# Patient Record
Sex: FEMALE | Race: WHITE | NOT HISPANIC OR LATINO | Employment: OTHER | RURAL
[De-identification: names, ages, dates, MRNs, and addresses within clinical notes are randomized per-mention and may not be internally consistent; named-entity substitution may affect disease eponyms.]

---

## 2017-01-06 ENCOUNTER — HISTORICAL (OUTPATIENT)
Dept: ADMINISTRATIVE | Facility: HOSPITAL | Age: 82
End: 2017-01-06

## 2017-01-09 LAB
LAB AP CLINICAL INFORMATION: NORMAL
LAB AP DIAGNOSIS - HISTORICAL: NORMAL
LAB AP GROSS PATHOLOGY - HISTORICAL: NORMAL
LAB AP SPECIMEN SUBMITTED - HISTORICAL: NORMAL

## 2020-06-04 ENCOUNTER — HISTORICAL (OUTPATIENT)
Dept: ADMINISTRATIVE | Facility: HOSPITAL | Age: 85
End: 2020-06-04

## 2021-04-19 ENCOUNTER — TELEPHONE (OUTPATIENT)
Dept: UROLOGY | Facility: CLINIC | Age: 86
End: 2021-04-19

## 2021-04-22 ENCOUNTER — HISTORICAL (OUTPATIENT)
Dept: ADMINISTRATIVE | Facility: HOSPITAL | Age: 86
End: 2021-04-22

## 2021-04-22 LAB
ANION GAP SERPL CALCULATED.3IONS-SCNC: 14 MMOL/L
BACTERIA #/AREA URNS HPF: ABNORMAL /HPF
BASOPHILS # BLD AUTO: 0.06 X10E3/UL (ref 0–0.2)
BASOPHILS NFR BLD AUTO: 0.7 % (ref 0–1)
BILIRUB UR QL STRIP: NEGATIVE MG/DL
BUN SERPL-MCNC: 26 MG/DL (ref 7–18)
CALCIUM SERPL-MCNC: 9.4 MG/DL (ref 8.5–10.1)
CHLORIDE SERPL-SCNC: 101 MMOL/L (ref 101–111)
CLARITY UR: ABNORMAL
CO2 SERPL-SCNC: 27 MMOL/L (ref 21–32)
COLOR UR: YELLOW
CREAT SERPL-MCNC: 1.2 MG/DL (ref 0.6–1.3)
EOSINOPHIL # BLD AUTO: 0.13 X10E3/UL (ref 0–0.5)
EOSINOPHIL NFR BLD AUTO: 1.5 % (ref 1–4)
ERYTHROCYTE [DISTWIDTH] IN BLOOD BY AUTOMATED COUNT: 15.1 % (ref 11.5–14.5)
GLUCOSE SERPL-MCNC: 137 MG/DL (ref 74–106)
GLUCOSE UR STRIP-MCNC: NEGATIVE MG/DL
HCT VFR BLD AUTO: 35.1 % (ref 38–47)
HGB BLD-MCNC: 12.2 G/DL (ref 12–16)
KETONES UR STRIP-SCNC: NEGATIVE MG/DL
LEUKOCYTE ESTERASE UR QL STRIP: ABNORMAL LEU/UL
LYMPHOCYTES # BLD AUTO: 3.18 X10E3/UL (ref 1–4.8)
LYMPHOCYTES NFR BLD AUTO: 37.5 % (ref 27–41)
MCH RBC QN AUTO: 33.2 PG (ref 27–31)
MCHC RBC AUTO-ENTMCNC: 34.8 G/DL (ref 32–36)
MCV RBC AUTO: 95 FL (ref 80–96)
MONOCYTES # BLD AUTO: 0.67 X10E3/UL (ref 0–0.8)
MONOCYTES NFR BLD AUTO: 7.9 % (ref 2–6)
MPC BLD CALC-MCNC: 9.7 FL (ref 9.4–12.4)
NEUTROPHILS # BLD AUTO: 4.43 X10E3/UL (ref 1.8–7.7)
NEUTROPHILS NFR BLD AUTO: 52.4 % (ref 53–65)
NITRITE UR QL STRIP: NEGATIVE
PH UR STRIP: 6 PH UNITS (ref 5–8)
PLATELET # BLD AUTO: 254 X10E3/UL (ref 150–400)
POTASSIUM SERPL-SCNC: 4.6 MMOL/L (ref 3.6–5)
PROT UR QL STRIP: 30 MG/DL
RBC # BLD AUTO: 3.68 X10E6/UL (ref 4.2–5.4)
RBC # UR STRIP: NEGATIVE ERY/UL
RBC #/AREA URNS HPF: ABNORMAL /HPF (ref 0–3)
SODIUM SERPL-SCNC: 137 MMOL/L (ref 135–145)
SP GR UR STRIP: 1.01 (ref 1–1.03)
SQUAMOUS #/AREA URNS LPF: ABNORMAL /LPF
UROBILINOGEN UR STRIP-ACNC: 0.2 MG/DL
WBC # BLD AUTO: 8.47 X10E3/UL (ref 4.5–11)
WBC #/AREA URNS HPF: ABNORMAL /HPF (ref 0–5)

## 2021-06-12 ENCOUNTER — HOSPITAL ENCOUNTER (INPATIENT)
Facility: HOSPITAL | Age: 86
LOS: 3 days | Discharge: NURSING, REHAB OR LONG-TERM CARE | DRG: 948 | End: 2021-06-15
Attending: INTERNAL MEDICINE | Admitting: INTERNAL MEDICINE
Payer: MEDICARE

## 2021-06-12 DIAGNOSIS — R52 INTRACTABLE PAIN: ICD-10-CM

## 2021-06-12 DIAGNOSIS — R53.1 ASTHENIA: ICD-10-CM

## 2021-06-12 DIAGNOSIS — N30.01 ACUTE CYSTITIS WITH HEMATURIA: ICD-10-CM

## 2021-06-12 DIAGNOSIS — R07.9 CHEST PAIN: ICD-10-CM

## 2021-06-12 DIAGNOSIS — S32.592A FRACTURE OF MULTIPLE PUBIC RAMI, LEFT, CLOSED, INITIAL ENCOUNTER: ICD-10-CM

## 2021-06-12 DIAGNOSIS — E87.5 HYPERKALEMIA: Primary | ICD-10-CM

## 2021-06-12 LAB
ALBUMIN SERPL BCP-MCNC: 3.5 G/DL (ref 3.5–5)
ALBUMIN/GLOB SERPL: 0.8 {RATIO}
ALP SERPL-CCNC: 75 U/L (ref 55–142)
ALT SERPL W P-5'-P-CCNC: 35 U/L (ref 13–56)
ANION GAP SERPL CALCULATED.3IONS-SCNC: 17 MMOL/L (ref 7–16)
AST SERPL W P-5'-P-CCNC: 44 U/L (ref 15–37)
BILIRUB SERPL-MCNC: 0.6 MG/DL (ref 0–1.2)
BUN SERPL-MCNC: 23 MG/DL (ref 7–18)
BUN/CREAT SERPL: 22 (ref 6–20)
CALCIUM SERPL-MCNC: 9.3 MG/DL (ref 8.5–10.1)
CHLORIDE SERPL-SCNC: 103 MMOL/L (ref 98–107)
CO2 SERPL-SCNC: 27 MMOL/L (ref 21–32)
CREAT SERPL-MCNC: 1.06 MG/DL (ref 0.55–1.02)
GLOBULIN SER-MCNC: 4.2 G/DL (ref 2–4)
GLUCOSE SERPL-MCNC: 156 MG/DL (ref 74–106)
LACTATE SERPL-SCNC: 1.8 MMOL/L (ref 0.4–2)
LACTATE SERPL-SCNC: 2.2 MMOL/L (ref 0.4–2)
POTASSIUM SERPL-SCNC: 5.1 MMOL/L (ref 3.5–5.1)
PROT SERPL-MCNC: 7.7 G/DL (ref 6.4–8.2)
SODIUM SERPL-SCNC: 142 MMOL/L (ref 136–145)

## 2021-06-12 PROCEDURE — 93010 EKG 12-LEAD: ICD-10-PCS | Mod: ,,, | Performed by: HOSPITALIST

## 2021-06-12 PROCEDURE — 83605 ASSAY OF LACTIC ACID: CPT | Performed by: INTERNAL MEDICINE

## 2021-06-12 PROCEDURE — 87086 URINE CULTURE/COLONY COUNT: CPT | Performed by: INTERNAL MEDICINE

## 2021-06-12 PROCEDURE — 25000003 PHARM REV CODE 250: Performed by: INTERNAL MEDICINE

## 2021-06-12 PROCEDURE — 93010 ELECTROCARDIOGRAM REPORT: CPT | Mod: ,,, | Performed by: HOSPITALIST

## 2021-06-12 PROCEDURE — 63600175 PHARM REV CODE 636 W HCPCS: Performed by: INTERNAL MEDICINE

## 2021-06-12 PROCEDURE — 99223 1ST HOSP IP/OBS HIGH 75: CPT | Mod: AI,,, | Performed by: INTERNAL MEDICINE

## 2021-06-12 PROCEDURE — 97162 PT EVAL MOD COMPLEX 30 MIN: CPT

## 2021-06-12 PROCEDURE — 99223 PR INITIAL HOSPITAL CARE,LEVL III: ICD-10-PCS | Mod: AI,,, | Performed by: INTERNAL MEDICINE

## 2021-06-12 PROCEDURE — 97166 OT EVAL MOD COMPLEX 45 MIN: CPT

## 2021-06-12 PROCEDURE — 36415 COLL VENOUS BLD VENIPUNCTURE: CPT | Performed by: INTERNAL MEDICINE

## 2021-06-12 PROCEDURE — 93005 ELECTROCARDIOGRAM TRACING: CPT

## 2021-06-12 PROCEDURE — 11000001 HC ACUTE MED/SURG PRIVATE ROOM

## 2021-06-12 PROCEDURE — 80053 COMPREHEN METABOLIC PANEL: CPT | Performed by: INTERNAL MEDICINE

## 2021-06-12 RX ORDER — HYDROCODONE BITARTRATE AND ACETAMINOPHEN 5; 325 MG/1; MG/1
1 TABLET ORAL EVERY 6 HOURS PRN
Status: DISCONTINUED | OUTPATIENT
Start: 2021-06-12 | End: 2021-06-15 | Stop reason: HOSPADM

## 2021-06-12 RX ORDER — SODIUM CHLORIDE 0.9 % (FLUSH) 0.9 %
10 SYRINGE (ML) INJECTION
Status: DISCONTINUED | OUTPATIENT
Start: 2021-06-12 | End: 2021-06-15 | Stop reason: HOSPADM

## 2021-06-12 RX ORDER — ACETAMINOPHEN 325 MG/1
650 TABLET ORAL EVERY 4 HOURS PRN
Status: DISCONTINUED | OUTPATIENT
Start: 2021-06-12 | End: 2021-06-15 | Stop reason: HOSPADM

## 2021-06-12 RX ORDER — LEVOTHYROXINE SODIUM 150 UG/1
150 TABLET ORAL
COMMUNITY
End: 2023-10-19

## 2021-06-12 RX ORDER — DIGOXIN 125 MCG
125 TABLET ORAL DAILY
Status: DISCONTINUED | OUTPATIENT
Start: 2021-06-12 | End: 2021-06-15 | Stop reason: HOSPADM

## 2021-06-12 RX ORDER — GABAPENTIN 100 MG/1
100 CAPSULE ORAL 2 TIMES DAILY
Status: DISCONTINUED | OUTPATIENT
Start: 2021-06-12 | End: 2021-06-15 | Stop reason: HOSPADM

## 2021-06-12 RX ORDER — ACETAMINOPHEN 325 MG/1
650 TABLET ORAL EVERY 8 HOURS PRN
Status: DISCONTINUED | OUTPATIENT
Start: 2021-06-12 | End: 2021-06-15 | Stop reason: HOSPADM

## 2021-06-12 RX ORDER — DIGOXIN 125 MCG
62.5 TABLET ORAL DAILY
COMMUNITY
End: 2021-09-20 | Stop reason: SDUPTHER

## 2021-06-12 RX ORDER — PROCHLORPERAZINE EDISYLATE 5 MG/ML
5 INJECTION INTRAMUSCULAR; INTRAVENOUS EVERY 6 HOURS PRN
Status: DISCONTINUED | OUTPATIENT
Start: 2021-06-12 | End: 2021-06-15 | Stop reason: HOSPADM

## 2021-06-12 RX ORDER — PROPRANOLOL HYDROCHLORIDE 10 MG/1
10 TABLET ORAL 2 TIMES DAILY
Status: DISCONTINUED | OUTPATIENT
Start: 2021-06-12 | End: 2021-06-15 | Stop reason: HOSPADM

## 2021-06-12 RX ORDER — TALC
6 POWDER (GRAM) TOPICAL NIGHTLY PRN
Status: DISCONTINUED | OUTPATIENT
Start: 2021-06-12 | End: 2021-06-15 | Stop reason: HOSPADM

## 2021-06-12 RX ORDER — ENOXAPARIN SODIUM 100 MG/ML
30 INJECTION SUBCUTANEOUS EVERY 24 HOURS
Status: DISCONTINUED | OUTPATIENT
Start: 2021-06-12 | End: 2021-06-15 | Stop reason: HOSPADM

## 2021-06-12 RX ORDER — NADOLOL 80 MG/1
80 TABLET ORAL DAILY
COMMUNITY

## 2021-06-12 RX ORDER — DILTIAZEM HYDROCHLORIDE 240 MG/1
240 CAPSULE, COATED, EXTENDED RELEASE ORAL DAILY
COMMUNITY

## 2021-06-12 RX ORDER — GABAPENTIN 100 MG/1
100 CAPSULE ORAL 2 TIMES DAILY
Status: ON HOLD | COMMUNITY
End: 2021-09-10

## 2021-06-12 RX ORDER — ONDANSETRON 2 MG/ML
4 INJECTION INTRAMUSCULAR; INTRAVENOUS EVERY 8 HOURS PRN
Status: DISCONTINUED | OUTPATIENT
Start: 2021-06-12 | End: 2021-06-15 | Stop reason: HOSPADM

## 2021-06-12 RX ORDER — AMOXICILLIN 250 MG
1 CAPSULE ORAL 2 TIMES DAILY
Status: DISCONTINUED | OUTPATIENT
Start: 2021-06-12 | End: 2021-06-15 | Stop reason: HOSPADM

## 2021-06-12 RX ORDER — EZETIMIBE 10 MG/1
10 TABLET ORAL DAILY
Status: DISCONTINUED | OUTPATIENT
Start: 2021-06-12 | End: 2021-06-15 | Stop reason: HOSPADM

## 2021-06-12 RX ORDER — LEVOTHYROXINE SODIUM 150 UG/1
150 TABLET ORAL
Status: DISCONTINUED | OUTPATIENT
Start: 2021-06-12 | End: 2021-06-15 | Stop reason: HOSPADM

## 2021-06-12 RX ORDER — HYDROCODONE BITARTRATE AND ACETAMINOPHEN 10; 325 MG/1; MG/1
1 TABLET ORAL EVERY 6 HOURS PRN
Status: DISCONTINUED | OUTPATIENT
Start: 2021-06-12 | End: 2021-06-15 | Stop reason: HOSPADM

## 2021-06-12 RX ORDER — DILTIAZEM HYDROCHLORIDE 240 MG/1
240 CAPSULE, COATED, EXTENDED RELEASE ORAL DAILY
Status: DISCONTINUED | OUTPATIENT
Start: 2021-06-12 | End: 2021-06-15 | Stop reason: HOSPADM

## 2021-06-12 RX ORDER — EZETIMIBE 10 MG/1
10 TABLET ORAL DAILY
COMMUNITY

## 2021-06-12 RX ADMIN — PROPRANOLOL HYDROCHLORIDE 10 MG: 10 TABLET ORAL at 10:06

## 2021-06-12 RX ADMIN — ENOXAPARIN SODIUM 30 MG: 30 INJECTION SUBCUTANEOUS at 06:06

## 2021-06-12 RX ADMIN — DIGOXIN 125 MCG: 125 TABLET ORAL at 10:06

## 2021-06-12 RX ADMIN — GABAPENTIN 100 MG: 100 CAPSULE ORAL at 10:06

## 2021-06-12 RX ADMIN — DILTIAZEM HYDROCHLORIDE 240 MG: 240 CAPSULE, COATED, EXTENDED RELEASE ORAL at 10:06

## 2021-06-12 RX ADMIN — SENNOSIDES, DOCUSATE SODIUM 1 TABLET: 8.6; 5 TABLET ORAL at 09:06

## 2021-06-12 RX ADMIN — LEVOTHYROXINE SODIUM 150 MCG: 150 TABLET ORAL at 05:06

## 2021-06-12 RX ADMIN — GABAPENTIN 100 MG: 100 CAPSULE ORAL at 09:06

## 2021-06-12 RX ADMIN — HYDROCODONE BITARTRATE AND ACETAMINOPHEN 1 TABLET: 5; 325 TABLET ORAL at 05:06

## 2021-06-12 RX ADMIN — CEFTRIAXONE SODIUM 1 G: 1 INJECTION, POWDER, FOR SOLUTION INTRAMUSCULAR; INTRAVENOUS at 05:06

## 2021-06-12 RX ADMIN — PROPRANOLOL HYDROCHLORIDE 10 MG: 10 TABLET ORAL at 09:06

## 2021-06-12 RX ADMIN — EZETIMIBE 10 MG: 10 TABLET ORAL at 10:06

## 2021-06-13 LAB
ALBUMIN SERPL BCP-MCNC: 3.1 G/DL (ref 3.5–5)
ALBUMIN/GLOB SERPL: 0.8 {RATIO}
ALP SERPL-CCNC: 69 U/L (ref 55–142)
ALT SERPL W P-5'-P-CCNC: 26 U/L (ref 13–56)
ANION GAP SERPL CALCULATED.3IONS-SCNC: 13 MMOL/L (ref 7–16)
AST SERPL W P-5'-P-CCNC: 32 U/L (ref 15–37)
BILIRUB SERPL-MCNC: 0.7 MG/DL (ref 0–1.2)
BUN SERPL-MCNC: 19 MG/DL (ref 7–18)
BUN/CREAT SERPL: 21 (ref 6–20)
CALCIUM SERPL-MCNC: 9 MG/DL (ref 8.5–10.1)
CHLORIDE SERPL-SCNC: 102 MMOL/L (ref 98–107)
CO2 SERPL-SCNC: 29 MMOL/L (ref 21–32)
CREAT SERPL-MCNC: 0.91 MG/DL (ref 0.55–1.02)
GLOBULIN SER-MCNC: 3.8 G/DL (ref 2–4)
GLUCOSE SERPL-MCNC: 105 MG/DL (ref 74–106)
POTASSIUM SERPL-SCNC: 4 MMOL/L (ref 3.5–5.1)
PROT SERPL-MCNC: 6.9 G/DL (ref 6.4–8.2)
SODIUM SERPL-SCNC: 140 MMOL/L (ref 136–145)

## 2021-06-13 PROCEDURE — 51798 US URINE CAPACITY MEASURE: CPT

## 2021-06-13 PROCEDURE — 25000003 PHARM REV CODE 250: Performed by: INTERNAL MEDICINE

## 2021-06-13 PROCEDURE — 97116 GAIT TRAINING THERAPY: CPT

## 2021-06-13 PROCEDURE — 97110 THERAPEUTIC EXERCISES: CPT

## 2021-06-13 PROCEDURE — 36415 COLL VENOUS BLD VENIPUNCTURE: CPT | Performed by: INTERNAL MEDICINE

## 2021-06-13 PROCEDURE — 99232 SBSQ HOSP IP/OBS MODERATE 35: CPT | Mod: ,,, | Performed by: HOSPITALIST

## 2021-06-13 PROCEDURE — 63600175 PHARM REV CODE 636 W HCPCS: Performed by: INTERNAL MEDICINE

## 2021-06-13 PROCEDURE — 80053 COMPREHEN METABOLIC PANEL: CPT | Performed by: INTERNAL MEDICINE

## 2021-06-13 PROCEDURE — 99232 PR SUBSEQUENT HOSPITAL CARE,LEVL II: ICD-10-PCS | Mod: ,,, | Performed by: HOSPITALIST

## 2021-06-13 PROCEDURE — 11000001 HC ACUTE MED/SURG PRIVATE ROOM

## 2021-06-13 RX ADMIN — EZETIMIBE 10 MG: 10 TABLET ORAL at 09:06

## 2021-06-13 RX ADMIN — GABAPENTIN 100 MG: 100 CAPSULE ORAL at 09:06

## 2021-06-13 RX ADMIN — SENNOSIDES, DOCUSATE SODIUM 1 TABLET: 8.6; 5 TABLET ORAL at 09:06

## 2021-06-13 RX ADMIN — PROPRANOLOL HYDROCHLORIDE 10 MG: 10 TABLET ORAL at 09:06

## 2021-06-13 RX ADMIN — HYDROCODONE BITARTRATE AND ACETAMINOPHEN 1 TABLET: 5; 325 TABLET ORAL at 06:06

## 2021-06-13 RX ADMIN — HYDROCODONE BITARTRATE AND ACETAMINOPHEN 1 TABLET: 5; 325 TABLET ORAL at 09:06

## 2021-06-13 RX ADMIN — ENOXAPARIN SODIUM 30 MG: 30 INJECTION SUBCUTANEOUS at 04:06

## 2021-06-13 RX ADMIN — LEVOTHYROXINE SODIUM 150 MCG: 150 TABLET ORAL at 05:06

## 2021-06-13 RX ADMIN — CEFTRIAXONE SODIUM 1 G: 1 INJECTION, POWDER, FOR SOLUTION INTRAMUSCULAR; INTRAVENOUS at 05:06

## 2021-06-13 RX ADMIN — DILTIAZEM HYDROCHLORIDE 240 MG: 240 CAPSULE, COATED, EXTENDED RELEASE ORAL at 09:06

## 2021-06-13 RX ADMIN — DIGOXIN 125 MCG: 125 TABLET ORAL at 09:06

## 2021-06-14 LAB
ALBUMIN SERPL BCP-MCNC: 3.1 G/DL (ref 3.5–5)
ALBUMIN/GLOB SERPL: 0.9 {RATIO}
ALP SERPL-CCNC: 71 U/L (ref 55–142)
ALT SERPL W P-5'-P-CCNC: 23 U/L (ref 13–56)
ANION GAP SERPL CALCULATED.3IONS-SCNC: 13 MMOL/L (ref 7–16)
AST SERPL W P-5'-P-CCNC: 30 U/L (ref 15–37)
BILIRUB SERPL-MCNC: 0.6 MG/DL (ref 0–1.2)
BUN SERPL-MCNC: 21 MG/DL (ref 7–18)
BUN/CREAT SERPL: 24 (ref 6–20)
CALCIUM SERPL-MCNC: 9.3 MG/DL (ref 8.5–10.1)
CHLORIDE SERPL-SCNC: 100 MMOL/L (ref 98–107)
CO2 SERPL-SCNC: 28 MMOL/L (ref 21–32)
CREAT SERPL-MCNC: 0.89 MG/DL (ref 0.55–1.02)
GLOBULIN SER-MCNC: 3.6 G/DL (ref 2–4)
GLUCOSE SERPL-MCNC: 112 MG/DL (ref 74–106)
POTASSIUM SERPL-SCNC: 4.1 MMOL/L (ref 3.5–5.1)
PROT SERPL-MCNC: 6.7 G/DL (ref 6.4–8.2)
SARS-COV-2 RNA RESP QL NAA+PROBE: NEGATIVE
SODIUM SERPL-SCNC: 137 MMOL/L (ref 136–145)
UA COMPLETE W REFLEX CULTURE PNL UR: NO GROWTH

## 2021-06-14 PROCEDURE — 63600175 PHARM REV CODE 636 W HCPCS: Performed by: INTERNAL MEDICINE

## 2021-06-14 PROCEDURE — 86580 TB INTRADERMAL TEST: CPT | Performed by: HOSPITALIST

## 2021-06-14 PROCEDURE — 30200315 PPD INTRADERMAL TEST REV CODE 302: Performed by: HOSPITALIST

## 2021-06-14 PROCEDURE — 99232 SBSQ HOSP IP/OBS MODERATE 35: CPT | Mod: ,,, | Performed by: HOSPITALIST

## 2021-06-14 PROCEDURE — 97535 SELF CARE MNGMENT TRAINING: CPT

## 2021-06-14 PROCEDURE — 87635 SARS-COV-2 COVID-19 AMP PRB: CPT | Performed by: HOSPITALIST

## 2021-06-14 PROCEDURE — 36415 COLL VENOUS BLD VENIPUNCTURE: CPT | Performed by: INTERNAL MEDICINE

## 2021-06-14 PROCEDURE — 80053 COMPREHEN METABOLIC PANEL: CPT | Performed by: INTERNAL MEDICINE

## 2021-06-14 PROCEDURE — 99232 PR SUBSEQUENT HOSPITAL CARE,LEVL II: ICD-10-PCS | Mod: ,,, | Performed by: HOSPITALIST

## 2021-06-14 PROCEDURE — 11000001 HC ACUTE MED/SURG PRIVATE ROOM

## 2021-06-14 PROCEDURE — 97110 THERAPEUTIC EXERCISES: CPT

## 2021-06-14 PROCEDURE — 25000003 PHARM REV CODE 250: Performed by: INTERNAL MEDICINE

## 2021-06-14 RX ADMIN — SENNOSIDES, DOCUSATE SODIUM 1 TABLET: 8.6; 5 TABLET ORAL at 09:06

## 2021-06-14 RX ADMIN — GABAPENTIN 100 MG: 100 CAPSULE ORAL at 09:06

## 2021-06-14 RX ADMIN — ONDANSETRON 4 MG: 2 INJECTION INTRAMUSCULAR; INTRAVENOUS at 05:06

## 2021-06-14 RX ADMIN — LEVOTHYROXINE SODIUM 150 MCG: 150 TABLET ORAL at 05:06

## 2021-06-14 RX ADMIN — DIGOXIN 125 MCG: 125 TABLET ORAL at 09:06

## 2021-06-14 RX ADMIN — DILTIAZEM HYDROCHLORIDE 240 MG: 240 CAPSULE, COATED, EXTENDED RELEASE ORAL at 09:06

## 2021-06-14 RX ADMIN — CEFTRIAXONE SODIUM 1 G: 1 INJECTION, POWDER, FOR SOLUTION INTRAMUSCULAR; INTRAVENOUS at 05:06

## 2021-06-14 RX ADMIN — PROPRANOLOL HYDROCHLORIDE 10 MG: 10 TABLET ORAL at 09:06

## 2021-06-14 RX ADMIN — HYDROCODONE BITARTRATE AND ACETAMINOPHEN 1 TABLET: 10; 325 TABLET ORAL at 12:06

## 2021-06-14 RX ADMIN — EZETIMIBE 10 MG: 10 TABLET ORAL at 09:06

## 2021-06-14 RX ADMIN — ENOXAPARIN SODIUM 30 MG: 30 INJECTION SUBCUTANEOUS at 05:06

## 2021-06-15 VITALS
DIASTOLIC BLOOD PRESSURE: 67 MMHG | HEIGHT: 63 IN | TEMPERATURE: 98 F | SYSTOLIC BLOOD PRESSURE: 146 MMHG | BODY MASS INDEX: 23.4 KG/M2 | WEIGHT: 132.06 LBS | RESPIRATION RATE: 18 BRPM | HEART RATE: 72 BPM | OXYGEN SATURATION: 92 %

## 2021-06-15 PROBLEM — E87.5 HYPERKALEMIA: Status: ACTIVE | Noted: 2021-06-15

## 2021-06-15 LAB
ALBUMIN SERPL BCP-MCNC: 2.9 G/DL (ref 3.5–5)
ALBUMIN/GLOB SERPL: 0.8 {RATIO}
ALP SERPL-CCNC: 65 U/L (ref 55–142)
ALT SERPL W P-5'-P-CCNC: 20 U/L (ref 13–56)
ANION GAP SERPL CALCULATED.3IONS-SCNC: 15 MMOL/L (ref 7–16)
AST SERPL W P-5'-P-CCNC: 41 U/L (ref 15–37)
BILIRUB SERPL-MCNC: 0.5 MG/DL (ref 0–1.2)
BUN SERPL-MCNC: 22 MG/DL (ref 7–18)
BUN/CREAT SERPL: 22 (ref 6–20)
CALCIUM SERPL-MCNC: 9.4 MG/DL (ref 8.5–10.1)
CHLORIDE SERPL-SCNC: 100 MMOL/L (ref 98–107)
CO2 SERPL-SCNC: 29 MMOL/L (ref 21–32)
CREAT SERPL-MCNC: 0.98 MG/DL (ref 0.55–1.02)
GLOBULIN SER-MCNC: 3.6 G/DL (ref 2–4)
GLUCOSE SERPL-MCNC: 114 MG/DL (ref 74–106)
POTASSIUM SERPL-SCNC: 5.3 MMOL/L (ref 3.5–5.1)
PROT SERPL-MCNC: 6.5 G/DL (ref 6.4–8.2)
SODIUM SERPL-SCNC: 139 MMOL/L (ref 136–145)

## 2021-06-15 PROCEDURE — 97110 THERAPEUTIC EXERCISES: CPT

## 2021-06-15 PROCEDURE — 30200315 PPD INTRADERMAL TEST REV CODE 302: Performed by: HOSPITALIST

## 2021-06-15 PROCEDURE — 99239 HOSP IP/OBS DSCHRG MGMT >30: CPT | Mod: ,,, | Performed by: HOSPITALIST

## 2021-06-15 PROCEDURE — 86580 TB INTRADERMAL TEST: CPT | Performed by: HOSPITALIST

## 2021-06-15 PROCEDURE — 25000003 PHARM REV CODE 250: Performed by: INTERNAL MEDICINE

## 2021-06-15 PROCEDURE — 36415 COLL VENOUS BLD VENIPUNCTURE: CPT | Performed by: INTERNAL MEDICINE

## 2021-06-15 PROCEDURE — 80053 COMPREHEN METABOLIC PANEL: CPT | Performed by: INTERNAL MEDICINE

## 2021-06-15 PROCEDURE — 99239 PR HOSPITAL DISCHARGE DAY,>30 MIN: ICD-10-PCS | Mod: ,,, | Performed by: HOSPITALIST

## 2021-06-15 PROCEDURE — 63600175 PHARM REV CODE 636 W HCPCS: Performed by: INTERNAL MEDICINE

## 2021-06-15 RX ORDER — ENOXAPARIN SODIUM 100 MG/ML
30 INJECTION SUBCUTANEOUS DAILY
Status: ON HOLD
Start: 2021-06-15 | End: 2021-09-10

## 2021-06-15 RX ORDER — ACETAMINOPHEN 325 MG/1
650 TABLET ORAL EVERY 4 HOURS PRN
Refills: 0
Start: 2021-06-15 | End: 2023-10-19

## 2021-06-15 RX ADMIN — GABAPENTIN 100 MG: 100 CAPSULE ORAL at 08:06

## 2021-06-15 RX ADMIN — EZETIMIBE 10 MG: 10 TABLET ORAL at 08:06

## 2021-06-15 RX ADMIN — DILTIAZEM HYDROCHLORIDE 240 MG: 240 CAPSULE, COATED, EXTENDED RELEASE ORAL at 08:06

## 2021-06-15 RX ADMIN — SENNOSIDES, DOCUSATE SODIUM 1 TABLET: 8.6; 5 TABLET ORAL at 08:06

## 2021-06-15 RX ADMIN — PROPRANOLOL HYDROCHLORIDE 10 MG: 10 TABLET ORAL at 08:06

## 2021-06-15 RX ADMIN — CEFTRIAXONE SODIUM 1 G: 1 INJECTION, POWDER, FOR SOLUTION INTRAMUSCULAR; INTRAVENOUS at 04:06

## 2021-06-15 RX ADMIN — DIGOXIN 125 MCG: 125 TABLET ORAL at 08:06

## 2021-06-15 RX ADMIN — HYDROCODONE BITARTRATE AND ACETAMINOPHEN 1 TABLET: 5; 325 TABLET ORAL at 03:06

## 2021-06-15 RX ADMIN — LEVOTHYROXINE SODIUM 150 MCG: 150 TABLET ORAL at 05:06

## 2021-06-15 RX ADMIN — TUBERCULIN PURIFIED PROTEIN DERIVATIVE 5 UNITS: 5 INJECTION, SOLUTION INTRADERMAL at 06:06

## 2021-07-08 DIAGNOSIS — Z95.0 PRESENCE OF PERMANENT CARDIAC PACEMAKER: Primary | ICD-10-CM

## 2021-07-28 ENCOUNTER — OFFICE VISIT (OUTPATIENT)
Dept: CARDIOLOGY | Facility: CLINIC | Age: 86
End: 2021-07-28
Payer: MEDICARE

## 2021-07-28 VITALS
BODY MASS INDEX: 19.49 KG/M2 | DIASTOLIC BLOOD PRESSURE: 60 MMHG | SYSTOLIC BLOOD PRESSURE: 140 MMHG | OXYGEN SATURATION: 98 % | WEIGHT: 110 LBS | HEART RATE: 58 BPM | HEIGHT: 63 IN

## 2021-07-28 DIAGNOSIS — I10 HYPERTENSION, UNSPECIFIED TYPE: ICD-10-CM

## 2021-07-28 DIAGNOSIS — I44.1 MOBITZ TYPE II ATRIOVENTRICULAR BLOCK: ICD-10-CM

## 2021-07-28 DIAGNOSIS — Z95.0 CARDIAC PACEMAKER IN SITU: ICD-10-CM

## 2021-07-28 PROCEDURE — 99214 OFFICE O/P EST MOD 30 MIN: CPT | Mod: PBBFAC | Performed by: NURSE PRACTITIONER

## 2021-07-28 PROCEDURE — 99214 PR OFFICE/OUTPT VISIT, EST, LEVL IV, 30-39 MIN: ICD-10-PCS | Mod: S$PBB,,, | Performed by: NURSE PRACTITIONER

## 2021-07-28 PROCEDURE — 99214 OFFICE O/P EST MOD 30 MIN: CPT | Mod: S$PBB,,, | Performed by: NURSE PRACTITIONER

## 2021-08-06 ENCOUNTER — HOSPITAL ENCOUNTER (OUTPATIENT)
Dept: CARDIOLOGY | Facility: HOSPITAL | Age: 86
Discharge: HOME | End: 2021-08-06
Attending: INTERNAL MEDICINE
Payer: MEDICARE

## 2021-08-06 ENCOUNTER — HOSPITAL ENCOUNTER (OUTPATIENT)
Dept: RADIOLOGY | Facility: HOSPITAL | Age: 86
Discharge: HOME | End: 2021-08-06
Attending: INTERNAL MEDICINE
Payer: MEDICARE

## 2021-08-06 DIAGNOSIS — Z95.0 PRESENCE OF PERMANENT CARDIAC PACEMAKER: ICD-10-CM

## 2021-08-06 DIAGNOSIS — I44.1 MOBITZ TYPE II ATRIOVENTRICULAR BLOCK: ICD-10-CM

## 2021-08-06 DIAGNOSIS — Z95.0 CARDIAC PACEMAKER IN SITU: ICD-10-CM

## 2021-08-06 DIAGNOSIS — R60.0 LOWER EXTREMITY EDEMA: ICD-10-CM

## 2021-08-06 DIAGNOSIS — E87.5 HYPERKALEMIA: ICD-10-CM

## 2021-08-06 DIAGNOSIS — Z01.812 PRE-OPERATIVE LABORATORY EXAMINATION: Primary | ICD-10-CM

## 2021-08-06 DIAGNOSIS — Z01.818 OTHER SPECIFIED PRE-OPERATIVE EXAMINATION: ICD-10-CM

## 2021-08-06 DIAGNOSIS — I10 HYPERTENSION, UNSPECIFIED TYPE: ICD-10-CM

## 2021-08-06 LAB
BATTERY VOLTAGE (V): 2.56 V
ERI (V): 2.5 V
OHS CV DC PP MS1: 0.4 MS
OHS CV DC PP MS2: 0.4 MS
OHS CV DC PP V1: 2 V
OHS CV DC PP V2: 2 V

## 2021-08-06 PROCEDURE — 71046 XR CHEST PA AND LATERAL: ICD-10-PCS | Mod: 26,,, | Performed by: RADIOLOGY

## 2021-08-06 PROCEDURE — 71046 X-RAY EXAM CHEST 2 VIEWS: CPT | Mod: TC

## 2021-08-06 PROCEDURE — 71046 X-RAY EXAM CHEST 2 VIEWS: CPT | Mod: 26,,, | Performed by: RADIOLOGY

## 2021-08-06 PROCEDURE — 93280 PM DEVICE PROGR EVAL DUAL: CPT

## 2021-08-06 PROCEDURE — 93280 CARDIAC DEVICE CHECK - IN CLINIC & HOSPITAL: ICD-10-PCS | Mod: 26,,, | Performed by: INTERNAL MEDICINE

## 2021-08-06 PROCEDURE — 93280 PM DEVICE PROGR EVAL DUAL: CPT | Mod: 26,,, | Performed by: INTERNAL MEDICINE

## 2021-08-12 DIAGNOSIS — I49.5 SSS (SICK SINUS SYNDROME): Primary | ICD-10-CM

## 2021-08-12 RX ORDER — SODIUM CHLORIDE 450 MG/100ML
INJECTION, SOLUTION INTRAVENOUS CONTINUOUS
Status: CANCELLED | OUTPATIENT
Start: 2021-08-26

## 2021-08-12 RX ORDER — DIPHENHYDRAMINE HCL 25 MG
50 CAPSULE ORAL
Status: CANCELLED | OUTPATIENT
Start: 2021-08-26

## 2021-08-12 RX ORDER — DIAZEPAM 2 MG/1
5 TABLET ORAL
Status: CANCELLED | OUTPATIENT
Start: 2021-08-26

## 2021-08-25 DIAGNOSIS — Z01.812 PRE-OPERATIVE LABORATORY EXAMINATION: Primary | ICD-10-CM

## 2021-09-02 DIAGNOSIS — Z01.812 PRE-OPERATIVE LABORATORY EXAMINATION: Primary | ICD-10-CM

## 2021-09-07 DIAGNOSIS — I44.1 MOBITZ TYPE II ATRIOVENTRICULAR BLOCK: Primary | ICD-10-CM

## 2021-09-07 RX ORDER — DIAZEPAM 2 MG/1
5 TABLET ORAL
Status: CANCELLED | OUTPATIENT
Start: 2021-09-10

## 2021-09-07 RX ORDER — SODIUM CHLORIDE 450 MG/100ML
INJECTION, SOLUTION INTRAVENOUS CONTINUOUS
Status: CANCELLED | OUTPATIENT
Start: 2021-09-10

## 2021-09-07 RX ORDER — DIPHENHYDRAMINE HCL 25 MG
50 CAPSULE ORAL
Status: CANCELLED | OUTPATIENT
Start: 2021-09-10

## 2021-09-10 ENCOUNTER — HOSPITAL ENCOUNTER (OUTPATIENT)
Facility: HOSPITAL | Age: 86
Discharge: HOME | End: 2021-09-10
Attending: INTERNAL MEDICINE | Admitting: INTERNAL MEDICINE
Payer: MEDICARE

## 2021-09-10 VITALS
TEMPERATURE: 98 F | HEIGHT: 61 IN | OXYGEN SATURATION: 97 % | DIASTOLIC BLOOD PRESSURE: 63 MMHG | RESPIRATION RATE: 14 BRPM | BODY MASS INDEX: 20.2 KG/M2 | SYSTOLIC BLOOD PRESSURE: 149 MMHG | HEART RATE: 60 BPM | WEIGHT: 107 LBS

## 2021-09-10 DIAGNOSIS — I44.1 MOBITZ TYPE II ATRIOVENTRICULAR BLOCK: ICD-10-CM

## 2021-09-10 DIAGNOSIS — Z95.0 CARDIAC PACEMAKER IN SITU: Primary | ICD-10-CM

## 2021-09-10 PROCEDURE — 63600175 PHARM REV CODE 636 W HCPCS: Performed by: INTERNAL MEDICINE

## 2021-09-10 PROCEDURE — 93005 ELECTROCARDIOGRAM TRACING: CPT | Mod: 59

## 2021-09-10 PROCEDURE — 93005 ELECTROCARDIOGRAM TRACING: CPT

## 2021-09-10 PROCEDURE — 33228 REMV&REPLC PM GEN DUAL LEAD: CPT | Performed by: INTERNAL MEDICINE

## 2021-09-10 PROCEDURE — 25000003 PHARM REV CODE 250: Performed by: INTERNAL MEDICINE

## 2021-09-10 PROCEDURE — C1785 PMKR, DUAL, RATE-RESP: HCPCS | Performed by: INTERNAL MEDICINE

## 2021-09-10 PROCEDURE — 93010 EKG 12-LEAD: ICD-10-PCS | Mod: 59,,, | Performed by: INTERNAL MEDICINE

## 2021-09-10 PROCEDURE — 99152 MOD SED SAME PHYS/QHP 5/>YRS: CPT | Performed by: INTERNAL MEDICINE

## 2021-09-10 PROCEDURE — 93010 ELECTROCARDIOGRAM REPORT: CPT | Mod: 59,,, | Performed by: INTERNAL MEDICINE

## 2021-09-10 PROCEDURE — 27100168 OPTIME MED/SURG SUP & DEVICES NON-STERILE SUPPLY: Performed by: INTERNAL MEDICINE

## 2021-09-10 PROCEDURE — 27201423 OPTIME MED/SURG SUP & DEVICES STERILE SUPPLY: Performed by: INTERNAL MEDICINE

## 2021-09-10 PROCEDURE — 99153 MOD SED SAME PHYS/QHP EA: CPT | Performed by: INTERNAL MEDICINE

## 2021-09-10 PROCEDURE — 33228 REMV&REPLC PM GEN DUAL LEAD: CPT | Mod: ,,, | Performed by: INTERNAL MEDICINE

## 2021-09-10 PROCEDURE — 33228 PR REMV&REPLC PM GEN DUAL LEAD: ICD-10-PCS | Mod: ,,, | Performed by: INTERNAL MEDICINE

## 2021-09-10 DEVICE — PACEMAKER AZURE XT DR MRI W/BLUESYNC: Type: IMPLANTABLE DEVICE | Site: CHEST | Status: FUNCTIONAL

## 2021-09-10 RX ORDER — SODIUM CHLORIDE 0.9 G/100ML
IRRIGANT IRRIGATION
Status: DISCONTINUED | OUTPATIENT
Start: 2021-09-10 | End: 2021-09-10 | Stop reason: HOSPADM

## 2021-09-10 RX ORDER — HEPARIN SOD,PORCINE/0.9 % NACL 1000/500ML
INTRAVENOUS SOLUTION INTRAVENOUS
Status: DISCONTINUED | OUTPATIENT
Start: 2021-09-10 | End: 2021-09-10 | Stop reason: HOSPADM

## 2021-09-10 RX ORDER — AMOXICILLIN 500 MG
1 CAPSULE ORAL DAILY
COMMUNITY

## 2021-09-10 RX ORDER — CEFAZOLIN SODIUM 1 G/3ML
INJECTION, POWDER, FOR SOLUTION INTRAMUSCULAR; INTRAVENOUS
Status: DISCONTINUED | OUTPATIENT
Start: 2021-09-10 | End: 2021-09-10 | Stop reason: HOSPADM

## 2021-09-10 RX ORDER — LIDOCAINE HYDROCHLORIDE 10 MG/ML
INJECTION INFILTRATION; PERINEURAL
Status: DISCONTINUED | OUTPATIENT
Start: 2021-09-10 | End: 2021-09-10 | Stop reason: HOSPADM

## 2021-09-10 RX ORDER — CEFAZOLIN SODIUM 2 G/50ML
2 SOLUTION INTRAVENOUS
Status: DISCONTINUED | OUTPATIENT
Start: 2021-09-10 | End: 2021-09-10 | Stop reason: HOSPADM

## 2021-09-10 RX ORDER — MIDAZOLAM HYDROCHLORIDE 1 MG/ML
INJECTION INTRAMUSCULAR; INTRAVENOUS
Status: DISCONTINUED | OUTPATIENT
Start: 2021-09-10 | End: 2021-09-10 | Stop reason: HOSPADM

## 2021-09-10 RX ORDER — FENTANYL CITRATE 50 UG/ML
INJECTION, SOLUTION INTRAMUSCULAR; INTRAVENOUS
Status: DISCONTINUED | OUTPATIENT
Start: 2021-09-10 | End: 2021-09-10 | Stop reason: HOSPADM

## 2021-09-10 RX ORDER — ASPIRIN 81 MG/1
81 TABLET ORAL 2 TIMES DAILY
COMMUNITY

## 2021-09-10 RX ORDER — SODIUM CHLORIDE 450 MG/100ML
INJECTION, SOLUTION INTRAVENOUS
Status: DISCONTINUED | OUTPATIENT
Start: 2021-09-10 | End: 2021-09-10 | Stop reason: HOSPADM

## 2021-09-20 ENCOUNTER — CLINICAL SUPPORT (OUTPATIENT)
Dept: CARDIOLOGY | Facility: CLINIC | Age: 86
End: 2021-09-20
Payer: MEDICARE

## 2021-09-20 PROCEDURE — 99211 OFF/OP EST MAY X REQ PHY/QHP: CPT | Mod: PBBFAC | Performed by: INTERNAL MEDICINE

## 2021-09-20 PROCEDURE — 99024 PR POST-OP FOLLOW-UP VISIT: ICD-10-PCS | Mod: ,,, | Performed by: INTERNAL MEDICINE

## 2021-09-20 PROCEDURE — 99024 POSTOP FOLLOW-UP VISIT: CPT | Mod: ,,, | Performed by: INTERNAL MEDICINE

## 2021-09-20 RX ORDER — DIGOXIN 125 MCG
62.5 TABLET ORAL DAILY
Qty: 45 TABLET | Refills: 1 | Status: SHIPPED | OUTPATIENT
Start: 2021-09-20 | End: 2022-10-06

## 2021-10-01 ENCOUNTER — HOSPITAL ENCOUNTER (OUTPATIENT)
Dept: CARDIOLOGY | Facility: HOSPITAL | Age: 86
Discharge: HOME | End: 2021-10-01
Attending: INTERNAL MEDICINE
Payer: MEDICARE

## 2021-10-01 DIAGNOSIS — Z95.0 CARDIAC PACEMAKER IN SITU: ICD-10-CM

## 2021-10-01 PROCEDURE — 93280 PM DEVICE PROGR EVAL DUAL: CPT

## 2021-10-01 PROCEDURE — 93280 PM DEVICE PROGR EVAL DUAL: CPT | Mod: 26,,, | Performed by: INTERNAL MEDICINE

## 2021-10-01 PROCEDURE — 93280 CARDIAC DEVICE CHECK - IN CLINIC & HOSPITAL: ICD-10-PCS | Mod: 26,,, | Performed by: INTERNAL MEDICINE

## 2021-10-04 DIAGNOSIS — Z95.0 PRESENCE OF CARDIAC PACEMAKER: Primary | ICD-10-CM

## 2021-10-06 LAB
OHS CV DC PP MS1: 0.4 MS
OHS CV DC PP MS2: 0.4 MS
OHS CV DC PP V1: 2 V
OHS CV DC PP V2: 2.5 V

## 2022-02-02 ENCOUNTER — OFFICE VISIT (OUTPATIENT)
Dept: CARDIOLOGY | Facility: CLINIC | Age: 87
End: 2022-02-02
Payer: MEDICARE

## 2022-02-02 VITALS
OXYGEN SATURATION: 97 % | WEIGHT: 112 LBS | HEART RATE: 61 BPM | HEIGHT: 61 IN | BODY MASS INDEX: 21.14 KG/M2 | SYSTOLIC BLOOD PRESSURE: 115 MMHG | DIASTOLIC BLOOD PRESSURE: 60 MMHG

## 2022-02-02 DIAGNOSIS — I49.5 SSS (SICK SINUS SYNDROME): Primary | ICD-10-CM

## 2022-02-02 PROCEDURE — 99214 PR OFFICE/OUTPT VISIT, EST, LEVL IV, 30-39 MIN: ICD-10-PCS | Mod: S$PBB,,, | Performed by: NURSE PRACTITIONER

## 2022-02-02 PROCEDURE — 99214 OFFICE O/P EST MOD 30 MIN: CPT | Mod: S$PBB,,, | Performed by: NURSE PRACTITIONER

## 2022-02-02 PROCEDURE — 99214 OFFICE O/P EST MOD 30 MIN: CPT | Mod: PBBFAC | Performed by: NURSE PRACTITIONER

## 2022-02-02 RX ORDER — LINACLOTIDE 72 UG/1
72 CAPSULE, GELATIN COATED ORAL DAILY
COMMUNITY
Start: 2021-12-20

## 2022-02-02 RX ORDER — LEVOTHYROXINE SODIUM 100 UG/1
100 TABLET ORAL
COMMUNITY
Start: 2021-12-21

## 2022-02-02 NOTE — PROGRESS NOTES
[facility] note        DATE OF SERVICE: 02/02/2022       PCP: Primary Doctor No      CHIEF COMPLAINT:   Chief Complaint   Patient presents with    Edema     Goes down at night.        HISTORY OF PRESENT ILLNESS:  Fior Hernández is a 89 y.o. female with a PMH of   Past Medical History:   Diagnosis Date    History of TIA (transient ischemic attack)     Hypertension     Mitral valve prolapse     Mobitz type 2 second degree AV block     Osteoarthritis     Thyroid disease      who presents for routine follow up.   Chief Complaint   Patient presents with    Edema     Goes down at night.            PAST MEDICAL HISTORY:  Past Medical History:   Diagnosis Date    History of TIA (transient ischemic attack)     Hypertension     Mitral valve prolapse     Mobitz type 2 second degree AV block     Osteoarthritis     Thyroid disease        PAST SURGICAL HISTORY:  Past Surgical History:   Procedure Laterality Date    APPENDECTOMY      CARDIAC CATHETERIZATION      DILATION AND CURETTAGE OF UTERUS      INSERT / REPLACE / REMOVE PACEMAKER      pubic fracture      REPLACEMENT OF DUAL CHAMBER PACEMAKER GENERATOR Left 9/10/2021    Procedure: REPLACEMENT, PULSE GENERATOR OF DUAL CHAMBER PACEMAKER;  Surgeon: Joel Sr MD;  Location: [facility] CATH LAB;  Service: Cardiology;  Laterality: Left;    THUMB AMPUTATION      WRIST SURGERY         SOCIAL HISTORY:  Social History     Socioeconomic History    Marital status:    Tobacco Use    Smoking status: Never Smoker    Smokeless tobacco: Never Used   Substance and Sexual Activity    Alcohol use: Never    Drug use: Never       FAMILY HISTORY:  Family History   Problem Relation Age of Onset    No Known Problems Mother     No Known Problems Father          ALLERGIES:  Review of patient's allergies indicates:   Allergen Reactions    Ace inhibitors Other (See Comments)     unknown    Atorvastatin Other (See Comments)     unknown    Naproxen  "Other (See Comments)     Unknown - family member states they dont think this is an allergy    Simvastatin Other (See Comments)     unknown        MEDICATIONS:    Current Outpatient Medications:     acetaminophen (TYLENOL) 325 MG tablet, Take 2 tablets (650 mg total) by mouth every 4 (four) hours as needed., Disp: , Rfl: 0    aspirin (ECOTRIN) 81 MG EC tablet, Take 81 mg by mouth once daily., Disp: , Rfl:     digoxin (LANOXIN) 125 mcg tablet, Take 0.5 tablets (62.5 mcg total) by mouth once daily., Disp: 45 tablet, Rfl: 1    diltiaZEM (CARDIZEM CD) 240 MG 24 hr capsule, Take 240 mg by mouth once daily., Disp: , Rfl:     ezetimibe (ZETIA) 10 mg tablet, Take 10 mg by mouth once daily., Disp: , Rfl:     levothyroxine (SYNTHROID) 100 MCG tablet, Take 100 mcg by mouth before breakfast., Disp: , Rfl:     LINZESS 72 mcg Cap capsule, Take 72 mcg by mouth Daily., Disp: , Rfl:     multivitamin capsule, Take 1 capsule by mouth once daily., Disp: , Rfl:     nadoloL (CORGARD) 80 MG tablet, Take 80 mg by mouth once daily., Disp: , Rfl:     omega-3 fatty acids/fish oil (FISH OIL-OMEGA-3 FATTY ACIDS) 300-1,000 mg capsule, Take 1 capsule by mouth once daily., Disp: , Rfl:     vit C/E/Zn/coppr/lutein/zeaxan (PRESERVISION AREDS-2 ORAL), Take by mouth once daily., Disp: , Rfl:     levothyroxine (SYNTHROID) 150 MCG tablet, Take 150 mcg by mouth before breakfast., Disp: , Rfl:     linaCLOtide (LINZESS) 145 mcg Cap capsule, Take 145 mcg by mouth before breakfast., Disp: , Rfl:   Medications have been reviewed and reconciled using the list provided by the patient.     PHYSICAL EXAM:  /60 (BP Location: Left arm, Patient Position: Sitting)   Pulse 61   Ht 5' 1" (1.549 m)   Wt 50.8 kg (112 lb)   SpO2 97%   BMI 21.16 kg/m²   Wt Readings from Last 3 Encounters:   02/02/22 50.8 kg (112 lb)   09/10/21 48.5 kg (107 lb)   07/28/21 49.9 kg (110 lb)      Body mass index is 21.16 kg/m².    Physical Exam  Nursing note reviewed. "   Constitutional:       Appearance: Normal appearance. She is normal weight.   HENT:      Head: Normocephalic.   Eyes:      Pupils: Pupils are equal, round, and reactive to light.   Neck:      Vascular: No carotid bruit.   Cardiovascular:      Rate and Rhythm: Normal rate and regular rhythm.      Pulses: Normal pulses.      Heart sounds: Normal heart sounds.      Comments: Well healed ppm site to left upper chest wall  Pulmonary:      Effort: Pulmonary effort is normal.      Breath sounds: Normal breath sounds.   Abdominal:      General: Bowel sounds are normal.      Palpations: Abdomen is soft.   Musculoskeletal:      Cervical back: Neck supple.      Right lower leg: No edema.      Left lower leg: No edema.   Skin:     General: Skin is warm and dry.      Capillary Refill: Capillary refill takes less than 2 seconds.   Neurological:      General: No focal deficit present.      Mental Status: She is alert and oriented to person, place, and time.   Psychiatric:         Mood and Affect: Mood normal.         Behavior: Behavior normal.         LABS REVIEWED:  Lab Results   Component Value Date    WBC 8.47 04/22/2021    RBC 3.68 (L) 04/22/2021    HGB 12.2 04/22/2021    HCT 35.1 (L) 04/22/2021    MCV 95 04/22/2021    MCH 33.2 (H) 04/22/2021    MCHC 34.8 04/22/2021    RDW 15.1 (H) 04/22/2021     04/22/2021    MPV 9.7 04/22/2021     Lab Results   Component Value Date     09/08/2021    K 4.2 09/08/2021     09/08/2021    CO2 28 09/08/2021    BUN 16 09/08/2021     Lab Results   Component Value Date    AST 41 (H) 06/15/2021    ALT 20 06/15/2021     Lab Results   Component Value Date     (H) 09/08/2021     No results found for: CHOL, HDL, TRIG, CHOLHDL          ASSESSMENT:   Patient Active Problem List   Diagnosis    Fracture of multiple pubic rami, left, closed, initial encounter    Intractable pain    Acute cystitis with hematuria    Asthenia    Hyperkalemia    Mobitz type II atrioventricular  block    Cardiac pacemaker in situ    Hypertension            Problem List Items Addressed This Visit    None          PLAN:     RTC: 6 months

## 2022-04-01 ENCOUNTER — HOSPITAL ENCOUNTER (OUTPATIENT)
Dept: CARDIOLOGY | Facility: HOSPITAL | Age: 87
Discharge: HOME | End: 2022-04-01
Attending: INTERNAL MEDICINE
Payer: MEDICARE

## 2022-04-01 DIAGNOSIS — Z95.0 PRESENCE OF CARDIAC PACEMAKER: ICD-10-CM

## 2022-04-01 PROCEDURE — 93280 PM DEVICE PROGR EVAL DUAL: CPT

## 2022-04-01 PROCEDURE — 93280 PM DEVICE PROGR EVAL DUAL: CPT | Mod: 26,,, | Performed by: INTERNAL MEDICINE

## 2022-04-01 PROCEDURE — 93280 CARDIAC DEVICE CHECK - IN CLINIC & HOSPITAL: ICD-10-PCS | Mod: 26,,, | Performed by: INTERNAL MEDICINE

## 2022-04-04 LAB
OHS CV DC PP MS1: 0.4 MS
OHS CV DC PP MS2: 0.4 MS
OHS CV DC PP V1: 1.5 V
OHS CV DC PP V2: 2.25 V

## 2022-04-16 ENCOUNTER — OUTSIDE PLACE OF SERVICE (OUTPATIENT)
Dept: CARDIOLOGY | Facility: CLINIC | Age: 87
End: 2022-04-16
Payer: MEDICARE

## 2022-04-16 PROCEDURE — 93010 PR ELECTROCARDIOGRAM REPORT: ICD-10-PCS | Mod: ,,, | Performed by: INTERNAL MEDICINE

## 2022-04-16 PROCEDURE — 93010 ELECTROCARDIOGRAM REPORT: CPT | Mod: ,,, | Performed by: INTERNAL MEDICINE

## 2022-05-19 DIAGNOSIS — Z95.0 PRESENCE OF CARDIAC PACEMAKER: Primary | ICD-10-CM

## 2022-08-02 ENCOUNTER — OFFICE VISIT (OUTPATIENT)
Dept: CARDIOLOGY | Facility: CLINIC | Age: 87
End: 2022-08-02
Payer: MEDICARE

## 2022-08-02 VITALS
BODY MASS INDEX: 21.52 KG/M2 | SYSTOLIC BLOOD PRESSURE: 100 MMHG | OXYGEN SATURATION: 97 % | WEIGHT: 114 LBS | HEIGHT: 61 IN | HEART RATE: 75 BPM | DIASTOLIC BLOOD PRESSURE: 64 MMHG

## 2022-08-02 DIAGNOSIS — I44.1 MOBITZ II: Primary | ICD-10-CM

## 2022-08-02 PROCEDURE — 99214 OFFICE O/P EST MOD 30 MIN: CPT | Mod: S$PBB,,, | Performed by: NURSE PRACTITIONER

## 2022-08-02 PROCEDURE — 93010 ELECTROCARDIOGRAM REPORT: CPT | Mod: S$PBB,,, | Performed by: INTERNAL MEDICINE

## 2022-08-02 PROCEDURE — 99214 PR OFFICE/OUTPT VISIT, EST, LEVL IV, 30-39 MIN: ICD-10-PCS | Mod: S$PBB,,, | Performed by: NURSE PRACTITIONER

## 2022-08-02 PROCEDURE — 93010 EKG 12-LEAD: ICD-10-PCS | Mod: S$PBB,,, | Performed by: INTERNAL MEDICINE

## 2022-08-02 PROCEDURE — 99214 OFFICE O/P EST MOD 30 MIN: CPT | Mod: PBBFAC | Performed by: NURSE PRACTITIONER

## 2022-08-02 PROCEDURE — 93005 ELECTROCARDIOGRAM TRACING: CPT | Mod: PBBFAC | Performed by: INTERNAL MEDICINE

## 2022-08-02 NOTE — PROGRESS NOTES
[facility] note        DATE OF SERVICE: 08/02/2022       PCP: Primary Doctor No      CHIEF COMPLAINT:   Chief Complaint   Patient presents with    Edema     Goes down at night.        HISTORY OF PRESENT ILLNESS:  Fior Hernández is a 89 y.o. female with a PMH of   Past Medical History:   Diagnosis Date    History of TIA (transient ischemic attack)     Hypertension     Mitral valve prolapse     Mobitz type 2 second degree AV block     Osteoarthritis     Thyroid disease      who presents for routine follow up.   Chief Complaint   Patient presents with    Edema     Goes down at night.            PAST MEDICAL HISTORY:  Past Medical History:   Diagnosis Date    History of TIA (transient ischemic attack)     Hypertension     Mitral valve prolapse     Mobitz type 2 second degree AV block     Osteoarthritis     Thyroid disease        PAST SURGICAL HISTORY:  Past Surgical History:   Procedure Laterality Date    APPENDECTOMY      CARDIAC CATHETERIZATION      DILATION AND CURETTAGE OF UTERUS      INSERT / REPLACE / REMOVE PACEMAKER      pubic fracture      REPLACEMENT OF DUAL CHAMBER PACEMAKER GENERATOR Left 9/10/2021    Procedure: REPLACEMENT, PULSE GENERATOR OF DUAL CHAMBER PACEMAKER;  Surgeon: Joel Sr MD;  Location: [facility] CATH LAB;  Service: Cardiology;  Laterality: Left;    THUMB AMPUTATION      WRIST SURGERY         SOCIAL HISTORY:  Social History     Socioeconomic History    Marital status:    Tobacco Use    Smoking status: Never Smoker    Smokeless tobacco: Never Used   Substance and Sexual Activity    Alcohol use: Never    Drug use: Never       FAMILY HISTORY:  Family History   Problem Relation Age of Onset    No Known Problems Mother     No Known Problems Father          ALLERGIES:  Review of patient's allergies indicates:   Allergen Reactions    Ace inhibitors Other (See Comments)     unknown    Atorvastatin Other (See Comments)     unknown    Naproxen  "Other (See Comments)     Unknown - family member states they dont think this is an allergy    Simvastatin Other (See Comments)     unknown        MEDICATIONS:    Current Outpatient Medications:     acetaminophen (TYLENOL) 325 MG tablet, Take 2 tablets (650 mg total) by mouth every 4 (four) hours as needed., Disp: , Rfl: 0    aspirin (ECOTRIN) 81 MG EC tablet, Take 81 mg by mouth once daily., Disp: , Rfl:     digoxin (LANOXIN) 125 mcg tablet, Take 0.5 tablets (62.5 mcg total) by mouth once daily., Disp: 45 tablet, Rfl: 1    diltiaZEM (CARDIZEM CD) 240 MG 24 hr capsule, Take 240 mg by mouth once daily., Disp: , Rfl:     ezetimibe (ZETIA) 10 mg tablet, Take 10 mg by mouth once daily., Disp: , Rfl:     levothyroxine (SYNTHROID) 100 MCG tablet, Take 100 mcg by mouth before breakfast., Disp: , Rfl:     linaCLOtide (LINZESS) 145 mcg Cap capsule, Take 145 mcg by mouth before breakfast., Disp: , Rfl:     LINZESS 72 mcg Cap capsule, Take 72 mcg by mouth Daily., Disp: , Rfl:     multivitamin capsule, Take 1 capsule by mouth once daily., Disp: , Rfl:     nadoloL (CORGARD) 80 MG tablet, Take 80 mg by mouth once daily., Disp: , Rfl:     omega-3 fatty acids/fish oil (FISH OIL-OMEGA-3 FATTY ACIDS) 300-1,000 mg capsule, Take 1 capsule by mouth once daily., Disp: , Rfl:     vit C/E/Zn/coppr/lutein/zeaxan (PRESERVISION AREDS-2 ORAL), Take by mouth once daily., Disp: , Rfl:     levothyroxine (SYNTHROID) 150 MCG tablet, Take 150 mcg by mouth before breakfast., Disp: , Rfl:   Medications have been reviewed and reconciled using the list provided by the patient.      PHYSICAL EXAM:  /64 (BP Location: Left arm, Patient Position: Sitting)   Pulse 75   Ht 5' 1" (1.549 m)   Wt 51.7 kg (114 lb)   SpO2 97%   BMI 21.54 kg/m²   Wt Readings from Last 3 Encounters:   08/02/22 51.7 kg (114 lb)   02/02/22 50.8 kg (112 lb)   09/10/21 48.5 kg (107 lb)      Body mass index is 21.54 kg/m².    Physical Exam  Vitals and nursing note " reviewed.   Constitutional:       Appearance: Normal appearance. She is normal weight.   HENT:      Head: Normocephalic and atraumatic.   Eyes:      Pupils: Pupils are equal, round, and reactive to light.   Neck:      Vascular: No carotid bruit.   Cardiovascular:      Rate and Rhythm: Normal rate.      Pulses: Normal pulses.      Heart sounds: Normal heart sounds.      Comments: Well healed ppm site to left upper chest wall  Pulmonary:      Effort: Pulmonary effort is normal.      Breath sounds: Normal breath sounds.   Abdominal:      General: Bowel sounds are normal.      Palpations: Abdomen is soft.   Musculoskeletal:      Cervical back: Neck supple.      Right lower leg: No edema.      Left lower leg: No edema.   Skin:     General: Skin is warm and dry.      Capillary Refill: Capillary refill takes less than 2 seconds.   Neurological:      General: No focal deficit present.      Mental Status: She is alert and oriented to person, place, and time.   Psychiatric:         Mood and Affect: Mood normal.         Behavior: Behavior normal.         LABS REVIEWED:  Lab Results   Component Value Date    WBC 8.47 04/22/2021    RBC 3.68 (L) 04/22/2021    HGB 12.2 04/22/2021    HCT 35.1 (L) 04/22/2021    MCV 95 04/22/2021    MCH 33.2 (H) 04/22/2021    MCHC 34.8 04/22/2021    RDW 15.1 (H) 04/22/2021     04/22/2021    MPV 9.7 04/22/2021     Lab Results   Component Value Date     09/08/2021    K 4.2 09/08/2021     09/08/2021    CO2 28 09/08/2021    BUN 16 09/08/2021     Lab Results   Component Value Date    AST 41 (H) 06/15/2021    ALT 20 06/15/2021     Lab Results   Component Value Date     (H) 09/08/2021     No results found for: CHOL, HDL, LDL, TRIG, CHOLHDL    CARDIAC STUDIES REVIEWED:EKG: A-V sequential paced rhythm; HR 66 bpm         ASSESSMENT:   Patient Active Problem List   Diagnosis    Fracture of multiple pubic rami, left, closed, initial encounter    Intractable pain    Acute cystitis  with hematuria    Asthenia    Hyperkalemia    Mobitz type II atrioventricular block    Cardiac pacemaker in situ    Hypertension            Problem List Items Addressed This Visit    None     Visit Diagnoses     Mobitz II    -  Primary    Relevant Orders    EKG 12-lead           PLAN: d/w the patient's son the need for a dig level with next blood draw by her pcp if not done recently.  Orders Placed This Encounter   Procedures    EKG 12-lead     Standing Status:   Future     Standing Expiration Date:   8/2/2023      RTC: 6 months

## 2022-10-14 ENCOUNTER — HOSPITAL ENCOUNTER (OUTPATIENT)
Dept: CARDIOLOGY | Facility: HOSPITAL | Age: 87
Discharge: HOME | End: 2022-10-14
Attending: INTERNAL MEDICINE
Payer: MEDICARE

## 2022-10-14 DIAGNOSIS — Z95.0 PRESENCE OF CARDIAC PACEMAKER: Primary | ICD-10-CM

## 2022-10-14 DIAGNOSIS — Z95.0 PRESENCE OF CARDIAC PACEMAKER: ICD-10-CM

## 2022-10-14 PROCEDURE — 93280 CARDIAC DEVICE CHECK - IN CLINIC & HOSPITAL: ICD-10-PCS | Mod: 26,,, | Performed by: INTERNAL MEDICINE

## 2022-10-14 PROCEDURE — 93280 PM DEVICE PROGR EVAL DUAL: CPT | Mod: 26,,, | Performed by: INTERNAL MEDICINE

## 2022-10-14 PROCEDURE — 93280 PM DEVICE PROGR EVAL DUAL: CPT

## 2022-10-17 LAB
BATTERY VOLTAGE (V): 3.03 V
OHS CV DC PP MS1: 0.4 MS
OHS CV DC PP MS2: 0.4 MS
OHS CV DC PP V1: 1.5 V
OHS CV DC PP V2: 2.25 V

## 2023-02-02 ENCOUNTER — OFFICE VISIT (OUTPATIENT)
Dept: CARDIOLOGY | Facility: CLINIC | Age: 88
End: 2023-02-02
Payer: MEDICARE

## 2023-02-02 VITALS
OXYGEN SATURATION: 98 % | HEIGHT: 61 IN | BODY MASS INDEX: 21.27 KG/M2 | SYSTOLIC BLOOD PRESSURE: 122 MMHG | WEIGHT: 112.63 LBS | DIASTOLIC BLOOD PRESSURE: 72 MMHG | HEART RATE: 72 BPM

## 2023-02-02 DIAGNOSIS — I44.1 MOBITZ TYPE II ATRIOVENTRICULAR BLOCK: ICD-10-CM

## 2023-02-02 DIAGNOSIS — I44.1 2ND DEGREE AV BLOCK: Primary | ICD-10-CM

## 2023-02-02 PROCEDURE — 93010 EKG 12-LEAD: ICD-10-PCS | Mod: S$PBB,,, | Performed by: INTERNAL MEDICINE

## 2023-02-02 PROCEDURE — 99214 OFFICE O/P EST MOD 30 MIN: CPT | Mod: PBBFAC | Performed by: NURSE PRACTITIONER

## 2023-02-02 PROCEDURE — 93005 ELECTROCARDIOGRAM TRACING: CPT | Mod: PBBFAC | Performed by: INTERNAL MEDICINE

## 2023-02-02 PROCEDURE — 99214 OFFICE O/P EST MOD 30 MIN: CPT | Mod: S$PBB,,, | Performed by: NURSE PRACTITIONER

## 2023-02-02 PROCEDURE — 93010 ELECTROCARDIOGRAM REPORT: CPT | Mod: S$PBB,,, | Performed by: INTERNAL MEDICINE

## 2023-02-02 PROCEDURE — 99214 PR OFFICE/OUTPT VISIT, EST, LEVL IV, 30-39 MIN: ICD-10-PCS | Mod: S$PBB,,, | Performed by: NURSE PRACTITIONER

## 2023-02-02 RX ORDER — LACTULOSE 10 G/15ML
SOLUTION ORAL; RECTAL
COMMUNITY
Start: 2022-05-05

## 2023-02-02 NOTE — PROGRESS NOTES
[facility] note        DATE OF SERVICE: 02/02/2023       PCP: Primary Doctor No      CHIEF COMPLAINT:   Chief Complaint   Patient presents with    Edema     Goes down at night.        HISTORY OF PRESENT ILLNESS:  Fior Hernández is a 89 y.o. female with a PMH of   Past Medical History:   Diagnosis Date    History of TIA (transient ischemic attack)     Hypertension     Mitral valve prolapse     Osteoarthritis     Thyroid disease      who presents for routine follow up.   Chief Complaint   Patient presents with    Edema     Goes down at night.            PAST MEDICAL HISTORY:  Past Medical History:   Diagnosis Date    History of TIA (transient ischemic attack)     Hypertension     Mitral valve prolapse     Osteoarthritis     Thyroid disease        PAST SURGICAL HISTORY:  Past Surgical History:   Procedure Laterality Date    APPENDECTOMY      CARDIAC CATHETERIZATION      DILATION AND CURETTAGE OF UTERUS      INSERT / REPLACE / REMOVE PACEMAKER      pubic fracture      REPLACEMENT OF DUAL CHAMBER PACEMAKER GENERATOR Left 9/10/2021    Procedure: REPLACEMENT, PULSE GENERATOR OF DUAL CHAMBER PACEMAKER;  Surgeon: Joel Sr MD;  Location: [facility] CATH LAB;  Service: Cardiology;  Laterality: Left;    THUMB AMPUTATION      WRIST SURGERY         SOCIAL HISTORY:  Social History     Socioeconomic History    Marital status:    Tobacco Use    Smoking status: Never    Smokeless tobacco: Never   Substance and Sexual Activity    Alcohol use: Never    Drug use: Never       FAMILY HISTORY:  Family History   Problem Relation Age of Onset    No Known Problems Mother     No Known Problems Father          ALLERGIES:  Review of patient's allergies indicates:   Allergen Reactions    Ace inhibitors Other (See Comments)     unknown    Atorvastatin Other (See Comments)     unknown    Naproxen Other (See Comments)     Unknown - family member states they dont think this is an allergy    Simvastatin Other (See Comments)  "    unknown        MEDICATIONS:    Current Outpatient Medications:     acetaminophen (TYLENOL) 325 MG tablet, Take 2 tablets (650 mg total) by mouth every 4 (four) hours as needed., Disp: , Rfl: 0    aspirin (ECOTRIN) 81 MG EC tablet, Take 81 mg by mouth 2 (two) times a day., Disp: , Rfl:     digoxin (LANOXIN) 125 mcg tablet, TAKE ONE-HALF (1/2) TABLET DAILY, Disp: 45 tablet, Rfl: 3    diltiaZEM (CARDIZEM CD) 240 MG 24 hr capsule, Take 240 mg by mouth once daily., Disp: , Rfl:     ezetimibe (ZETIA) 10 mg tablet, Take 10 mg by mouth once daily., Disp: , Rfl:     lactulose (CHRONULAC) 10 gram/15 mL solution, , Disp: , Rfl:     levothyroxine (SYNTHROID) 100 MCG tablet, Take 100 mcg by mouth before breakfast., Disp: , Rfl:     LINZESS 72 mcg Cap capsule, Take 72 mcg by mouth Daily., Disp: , Rfl:     multivitamin capsule, Take 1 capsule by mouth once daily., Disp: , Rfl:     nadoloL (CORGARD) 80 MG tablet, Take 80 mg by mouth once daily., Disp: , Rfl:     omega-3 fatty acids/fish oil (FISH OIL-OMEGA-3 FATTY ACIDS) 300-1,000 mg capsule, Take 1 capsule by mouth once daily., Disp: , Rfl:     vit C/E/Zn/coppr/lutein/zeaxan (PRESERVISION AREDS-2 ORAL), Take by mouth once daily., Disp: , Rfl:     levothyroxine (SYNTHROID) 150 MCG tablet, Take 150 mcg by mouth before breakfast., Disp: , Rfl:     linaCLOtide (LINZESS) 145 mcg Cap capsule, Take 145 mcg by mouth before breakfast., Disp: , Rfl:   Medications have been reviewed and reconciled using the list provided by the patient.      PHYSICAL EXAM:  /72 (BP Location: Left arm, Patient Position: Sitting)   Pulse 72   Ht 5' 1" (1.549 m)   Wt 51.1 kg (112 lb 9.6 oz)   SpO2 98%   BMI 21.28 kg/m²   Wt Readings from Last 3 Encounters:   02/02/23 51.1 kg (112 lb 9.6 oz)   08/02/22 51.7 kg (114 lb)   02/02/22 50.8 kg (112 lb)      Body mass index is 21.28 kg/m².    Physical Exam  Vitals and nursing note reviewed.   Constitutional:       Appearance: Normal appearance. She is " normal weight.   HENT:      Head: Normocephalic and atraumatic.   Eyes:      Pupils: Pupils are equal, round, and reactive to light.   Neck:      Vascular: No carotid bruit.   Cardiovascular:      Rate and Rhythm: Normal rate.      Pulses: Normal pulses.      Heart sounds: Normal heart sounds.      Comments: Well healed ppm site to left upper chest wall  Pulmonary:      Effort: Pulmonary effort is normal.      Breath sounds: Normal breath sounds.   Abdominal:      General: Bowel sounds are normal.      Palpations: Abdomen is soft.   Musculoskeletal:      Cervical back: Neck supple.      Right lower leg: No edema.      Left lower leg: No edema.   Skin:     General: Skin is warm and dry.      Capillary Refill: Capillary refill takes less than 2 seconds.   Neurological:      General: No focal deficit present.      Mental Status: She is alert and oriented to person, place, and time.   Psychiatric:         Mood and Affect: Mood normal.         Behavior: Behavior normal.       LABS REVIEWED:  Lab Results   Component Value Date    WBC 8.47 2021    RBC 3.68 (L) 2021    HGB 12.2 2021    HCT 35.1 (L) 2021    MCV 95 2021    MCH 33.2 (H) 2021    MCHC 34.8 2021    RDW 15.1 (H) 2021     2021    MPV 9.7 2021     Lab Results   Component Value Date     2021    K 4.2 2021     2021    CO2 28 2021    BUN 16 2021     Lab Results   Component Value Date    AST 41 (H) 06/15/2021    ALT 20 06/15/2021     Lab Results   Component Value Date     (H) 2021     No results found for: CHOL, HDL, LDL, TRIG, CHOLHDL    CARDIAC STUDIES REVIEWED:EK2023 AV dual paced rhythm; HR 68 bpm  2022 A-V sequential paced rhythm; HR 66 bpm         ASSESSMENT:   Patient Active Problem List   Diagnosis    Fracture of multiple pubic rami, left, closed, initial encounter    Intractable pain    Acute cystitis with hematuria    Asthenia     Hyperkalemia    Mobitz type II atrioventricular block    Cardiac pacemaker in situ    Hypertension      Mobitz type II atrioventricular block  Continue ppm interrogations as scheduled.     Problem List Items Addressed This Visit          Cardiac/Vascular    Mobitz type II atrioventricular block    Overview     S/p ppm          Current Assessment & Plan     Continue ppm interrogations as scheduled.          Other Visit Diagnoses       2nd degree AV block    -  Primary    Relevant Orders    EKG 12-lead             PLAN:   Orders Placed This Encounter   Procedures    EKG 12-lead     Standing Status:   Future     Number of Occurrences:   1     Standing Expiration Date:   2/2/2024      RTC: 6 months

## 2023-04-18 ENCOUNTER — HOSPITAL ENCOUNTER (OUTPATIENT)
Dept: CARDIOLOGY | Facility: HOSPITAL | Age: 88
Discharge: HOME | End: 2023-04-18
Attending: INTERNAL MEDICINE
Payer: MEDICARE

## 2023-04-18 DIAGNOSIS — Z95.0 PRESENCE OF CARDIAC PACEMAKER: Primary | ICD-10-CM

## 2023-04-18 DIAGNOSIS — Z95.0 PRESENCE OF CARDIAC PACEMAKER: ICD-10-CM

## 2023-04-18 PROCEDURE — 93288 INTERROG EVL PM/LDLS PM IP: CPT

## 2023-04-18 PROCEDURE — 93288 INTERROG EVL PM/LDLS PM IP: CPT | Mod: 26,,, | Performed by: INTERNAL MEDICINE

## 2023-04-18 PROCEDURE — 93288 CARDIAC DEVICE CHECK - IN CLINIC & HOSPITAL: ICD-10-PCS | Mod: 26,,, | Performed by: INTERNAL MEDICINE

## 2023-04-19 LAB
BATTERY VOLTAGE (V): 3.01 V
ERI (V): 2.63 V

## 2023-05-01 ENCOUNTER — OUTSIDE PLACE OF SERVICE (OUTPATIENT)
Dept: CARDIOLOGY | Facility: HOSPITAL | Age: 88
End: 2023-05-01
Payer: MEDICARE

## 2023-05-01 PROCEDURE — 93010 ELECTROCARDIOGRAM REPORT: CPT | Mod: ,,, | Performed by: INTERNAL MEDICINE

## 2023-05-01 PROCEDURE — 93010 PR ELECTROCARDIOGRAM REPORT: ICD-10-PCS | Mod: ,,, | Performed by: INTERNAL MEDICINE

## 2023-05-02 ENCOUNTER — OFFICE VISIT (OUTPATIENT)
Dept: CARDIOLOGY | Facility: CLINIC | Age: 88
End: 2023-05-02
Payer: MEDICARE

## 2023-05-02 ENCOUNTER — HOSPITAL ENCOUNTER (OUTPATIENT)
Dept: RADIOLOGY | Facility: HOSPITAL | Age: 88
Discharge: HOME | End: 2023-05-02
Attending: NURSE PRACTITIONER
Payer: MEDICARE

## 2023-05-02 VITALS
DIASTOLIC BLOOD PRESSURE: 62 MMHG | BODY MASS INDEX: 20.42 KG/M2 | WEIGHT: 108.19 LBS | OXYGEN SATURATION: 97 % | HEART RATE: 68 BPM | SYSTOLIC BLOOD PRESSURE: 112 MMHG | HEIGHT: 61 IN

## 2023-05-02 DIAGNOSIS — R00.0 TACHYCARDIA: Primary | ICD-10-CM

## 2023-05-02 DIAGNOSIS — R00.0 TACHYCARDIA: ICD-10-CM

## 2023-05-02 DIAGNOSIS — R00.2 PALPITATIONS: ICD-10-CM

## 2023-05-02 DIAGNOSIS — R06.02 SOB (SHORTNESS OF BREATH): ICD-10-CM

## 2023-05-02 PROCEDURE — 93010 ELECTROCARDIOGRAM REPORT: CPT | Mod: S$PBB,,, | Performed by: INTERNAL MEDICINE

## 2023-05-02 PROCEDURE — 99214 PR OFFICE/OUTPT VISIT, EST, LEVL IV, 30-39 MIN: ICD-10-PCS | Mod: S$PBB,,, | Performed by: NURSE PRACTITIONER

## 2023-05-02 PROCEDURE — 71046 X-RAY EXAM CHEST 2 VIEWS: CPT | Mod: TC

## 2023-05-02 PROCEDURE — 99215 OFFICE O/P EST HI 40 MIN: CPT | Mod: PBBFAC,25 | Performed by: NURSE PRACTITIONER

## 2023-05-02 PROCEDURE — 93010 EKG 12-LEAD: ICD-10-PCS | Mod: S$PBB,,, | Performed by: INTERNAL MEDICINE

## 2023-05-02 PROCEDURE — 93246 EXT ECG>7D<15D RECORDING: CPT | Performed by: NURSE PRACTITIONER

## 2023-05-02 PROCEDURE — 93005 ELECTROCARDIOGRAM TRACING: CPT | Mod: PBBFAC | Performed by: INTERNAL MEDICINE

## 2023-05-02 PROCEDURE — 71046 XR CHEST PA AND LATERAL: ICD-10-PCS | Mod: 26,,, | Performed by: RADIOLOGY

## 2023-05-02 PROCEDURE — 71046 X-RAY EXAM CHEST 2 VIEWS: CPT | Mod: 26,,, | Performed by: RADIOLOGY

## 2023-05-02 PROCEDURE — 99214 OFFICE O/P EST MOD 30 MIN: CPT | Mod: S$PBB,,, | Performed by: NURSE PRACTITIONER

## 2023-05-02 RX ORDER — CALCIUM CARBONATE 500(1250)
1 TABLET,CHEWABLE ORAL DAILY
COMMUNITY

## 2023-05-02 NOTE — PROGRESS NOTES
PCP: Primary Doctor No    Referring Provider:     Subjective:   Fior Hernández is a 89 y.o. female with hx of HTN, hypothyroidism, Mobitz type II AVB s/p ppm, who presents for follow up for episodes of palpitations where she feels her heart racing, SOB and weak. She was seen in the [facility] ED yesterday and those records are unavailable except for an EKG and labs brought by her son. EKG demonstrated a snesed v paced rhythm; rate 80 bpm; CBC, CMP, serum osmolality and tropoini were all normal. Her Cr/bUN were slightly elevated and her serum mag was low. Her pBNp was elevated as well. Her son states that now CXR was done. She has had a cough and has had green phlegm that she has been blowing out of her nose. She denies fever and her WBC was WNL. She has VAZQUEZ and with palpitations but no orthopnea or pnd. She has had lower ext edema but none today. She was given IV magnesium and her son thinks this may be why her edema is better. She has had issues with electrolytes in the past that was resolved with drinking Gatorade.         Fhx:  Family History   Problem Relation Age of Onset    No Known Problems Mother     No Known Problems Father      Shx:   Social History     Socioeconomic History    Marital status:    Tobacco Use    Smoking status: Never    Smokeless tobacco: Never   Substance and Sexual Activity    Alcohol use: Never    Drug use: Never       EKG 5/2/2023 AV dual paced rhythm; HR 61 bpm;   5/1/2023 a sensed v paced rhythm; HR 80 bpm  2/2/2023 AV dual paced rhythm; HR 68 bpm    Lexiscan 10/1/2012  Small apical perfusion defect with subtle improvement in rest images suspicious for ischemia  Small to moderate sized but subtle latero-basilar wall perfusion defect, possible ischemic territory. However can not r/o the possibility of artifact  Normal LV size and systolic function, EF 84%  This was discussed with the patient and her son Sp. Medical management was determined to be the option they they chose.         ECHO  10/1/2012  Normal chamber size, wall motion and systolic function, EF 55%  The aortic valve is calcified and sclerotic but maintains normal opening with trace AI.   Mitral valve and tricuspid valve are also sclerotic.  Pulmonic valve is not well visualized  Mild MR and moderate TR with RVSP 43 mmHg  Incidentally noticed is the pacemaker lead artifact in the right sided cardiac chambers  Positive bubbles noticed across into the left-sided chambers in small amount. Findings could be from intracardiac shunt    [facility] 3/23/2004  Normal right dominant coronary arteries free of significant CAD  No significant gradient was seen across the aortic valve- no significant aortic valvular stenosis  Normal LVSF  No significant MR        Lab Results   Component Value Date     09/08/2021    K 4.2 09/08/2021     09/08/2021    CO2 28 09/08/2021    BUN 16 09/08/2021    CREATININE 0.87 09/08/2021    CALCIUM 9.2 09/08/2021    ANIONGAP 11 09/08/2021    ESTGFRAFRICA 53 04/22/2021    EGFRNONAA 64 09/08/2021       No results found for: CHOL  No results found for: HDL  No results found for: LDLCALC  No results found for: TRIG  No results found for: CHOLHDL    Lab Results   Component Value Date    WBC 8.47 04/22/2021    HGB 12.2 04/22/2021    HCT 35.1 (L) 04/22/2021    MCV 95 04/22/2021     04/22/2021           Current Outpatient Medications:     aspirin (ECOTRIN) 81 MG EC tablet, Take 81 mg by mouth 2 (two) times a day., Disp: , Rfl:     calcium carbonate (OS-FARIDA) 500 mg calcium (1,250 mg) chewable tablet, Take 1 tablet by mouth once daily., Disp: , Rfl:     digoxin (LANOXIN) 125 mcg tablet, TAKE ONE-HALF (1/2) TABLET DAILY, Disp: 45 tablet, Rfl: 3    diltiaZEM (CARDIZEM CD) 240 MG 24 hr capsule, Take 240 mg by mouth once daily., Disp: , Rfl:     ezetimibe (ZETIA) 10 mg tablet, Take 10 mg by mouth once daily., Disp: , Rfl:     lactulose (CHRONULAC) 10 gram/15 mL solution, , Disp: , Rfl:     levothyroxine (SYNTHROID) 100 MCG  "tablet, Take 100 mcg by mouth before breakfast., Disp: , Rfl:     LINZESS 72 mcg Cap capsule, Take 72 mcg by mouth Daily., Disp: , Rfl:     multivitamin capsule, Take 1 capsule by mouth once daily., Disp: , Rfl:     nadoloL (CORGARD) 80 MG tablet, Take 80 mg by mouth once daily., Disp: , Rfl:     omega-3 fatty acids/fish oil (FISH OIL-OMEGA-3 FATTY ACIDS) 300-1,000 mg capsule, Take 1 capsule by mouth once daily., Disp: , Rfl:     vit C/E/Zn/coppr/lutein/zeaxan (PRESERVISION AREDS-2 ORAL), Take by mouth once daily., Disp: , Rfl:     acetaminophen (TYLENOL) 325 MG tablet, Take 2 tablets (650 mg total) by mouth every 4 (four) hours as needed. (Patient not taking: Reported on 5/2/2023), Disp: , Rfl: 0    levothyroxine (SYNTHROID) 150 MCG tablet, Take 150 mcg by mouth before breakfast., Disp: , Rfl:     linaCLOtide (LINZESS) 145 mcg Cap capsule, Take 145 mcg by mouth before breakfast., Disp: , Rfl:   Meds reviewed and reconciled using the list provided by the patient.     Review of Systems   Respiratory:  Positive for shortness of breath.    Cardiovascular:  Positive for palpitations and leg swelling.         Objective:   /62 (BP Location: Left arm, Patient Position: Sitting)   Pulse 68   Ht 5' 1" (1.549 m)   Wt 49.1 kg (108 lb 3.2 oz)   SpO2 97%   BMI 20.44 kg/m²     Physical Exam  Vitals and nursing note reviewed.   Constitutional:       Appearance: Normal appearance. She is normal weight.   HENT:      Head: Normocephalic and atraumatic.   Neck:      Vascular: No carotid bruit.   Cardiovascular:      Rate and Rhythm: Normal rate and regular rhythm.      Pulses: Normal pulses.      Heart sounds: Normal heart sounds.      Comments: Well healed ppm site to left upper chest wall  Pulmonary:      Effort: Pulmonary effort is normal.      Breath sounds: Normal breath sounds.   Abdominal:      Palpations: Abdomen is soft.   Musculoskeletal:      Cervical back: Neck supple.      Right lower leg: Edema present.      " Left lower leg: Edema present.      Comments: Trace edema BLE   Skin:     General: Skin is warm and dry.      Capillary Refill: Capillary refill takes less than 2 seconds.   Neurological:      General: No focal deficit present.      Mental Status: She is alert.         Assessment:     1. Tachycardia  EKG 12-lead    X-Ray Chest PA And Lateral    Cardiac Monitor - 3-15 Day Adult (Cupid Only)    Urinalysis    Echo    EKG 12-lead      2. SOB (shortness of breath)  X-Ray Chest PA And Lateral    Echo      3. Palpitations              Plan:   Palpitations  Zio monitor  Records from [facility]  UA  Pa/lat CXR  Echocardiogram  Magox 400 mg po daily  RTC: 4-6 weeks

## 2023-05-02 NOTE — ASSESSMENT & PLAN NOTE
Sandee monitor  Records from [facility]  UA  Pa/lat CXR  Echocardiogram  Magox 400 mg po daily  RTC: 4-6 weeks

## 2023-05-23 ENCOUNTER — HOSPITAL ENCOUNTER (OUTPATIENT)
Dept: CARDIOLOGY | Facility: HOSPITAL | Age: 88
Discharge: HOME | End: 2023-05-23
Attending: NURSE PRACTITIONER
Payer: MEDICARE

## 2023-05-23 VITALS — HEIGHT: 61 IN | WEIGHT: 108 LBS | BODY MASS INDEX: 20.39 KG/M2

## 2023-05-23 DIAGNOSIS — R00.0 TACHYCARDIA: ICD-10-CM

## 2023-05-23 DIAGNOSIS — R06.02 SOB (SHORTNESS OF BREATH): ICD-10-CM

## 2023-05-23 PROCEDURE — 93306 TTE W/DOPPLER COMPLETE: CPT | Mod: 26,,, | Performed by: INTERNAL MEDICINE

## 2023-05-23 PROCEDURE — 93306 TTE W/DOPPLER COMPLETE: CPT

## 2023-05-23 PROCEDURE — 93306 ECHO (CUPID ONLY): ICD-10-PCS | Mod: 26,,, | Performed by: INTERNAL MEDICINE

## 2023-05-24 LAB
AORTIC VALVE CUSP SEPERATION: 1.53 CM
AV INDEX (PROSTH): 0.59
AV MEAN GRADIENT: 5 MMHG
AV PEAK GRADIENT: 10 MMHG
AV REGURGITATION PRESSURE HALF TIME: 645 MS
AV VALVE AREA: 1.55 CM2
AV VELOCITY RATIO: 0.63
BSA FOR ECHO PROCEDURE: 1.45 M2
CV ECHO LV RWT: 0.3 CM
DOP CALC AO PEAK VEL: 1.6 M/S
DOP CALC AO VTI: 33.96 CM
DOP CALC LVOT AREA: 2.6 CM2
DOP CALC LVOT DIAMETER: 1.83 CM
DOP CALC LVOT PEAK VEL: 1 M/S
DOP CALC LVOT STROKE VOLUME: 52.74 CM3
DOP CALCLVOT PEAK VEL VTI: 20.06 CM
E WAVE DECELERATION TIME: 221 MSEC
ECHO LV POSTERIOR WALL: 0.62 CM (ref 0.6–1.1)
EJECTION FRACTION: 60 %
FRACTIONAL SHORTENING: 29 % (ref 28–44)
INTERVENTRICULAR SEPTUM: 0.99 CM (ref 0.6–1.1)
LEFT ATRIUM SIZE: 4.3 CM
LEFT INTERNAL DIMENSION IN SYSTOLE: 2.9 CM (ref 2.1–4)
LEFT VENTRICLE DIASTOLIC VOLUME INDEX: 50.6 ML/M2
LEFT VENTRICLE DIASTOLIC VOLUME: 73.37 ML
LEFT VENTRICLE MASS INDEX: 67 G/M2
LEFT VENTRICLE SYSTOLIC VOLUME INDEX: 22.2 ML/M2
LEFT VENTRICLE SYSTOLIC VOLUME: 32.21 ML
LEFT VENTRICULAR INTERNAL DIMENSION IN DIASTOLE: 4.08 CM (ref 3.5–6)
LEFT VENTRICULAR MASS: 97.36 G
LVOT MG: 2 MMHG
MV PEAK E VEL: 0.71 M/S
PISA AR MAX VEL: 4.18 M/S
PISA TR MAX VEL: 3.19 M/S
RA PRESSURE: 3 MMHG
RIGHT ATRIAL AREA: 10.38 CM2
RIGHT VENTRICULAR END-DIASTOLIC DIMENSION: 1.9 CM
TR MAX PG: 41 MMHG
TV REST PULMONARY ARTERY PRESSURE: 44 MMHG

## 2023-05-31 NOTE — PROGRESS NOTES
Mr Caio notified and states he suspects medical management and will run  it by her, but if we don't hear anything we will continue same treatment.

## 2023-06-06 PROCEDURE — 93248 EXT ECG>7D<15D REV&INTERPJ: CPT | Mod: ,,, | Performed by: INTERNAL MEDICINE

## 2023-06-06 PROCEDURE — 93248 PR EXT ECG, CONT, > 7 DAYS <= 15 DAYS, REVIEW W/INT: ICD-10-PCS | Mod: ,,, | Performed by: INTERNAL MEDICINE

## 2023-08-08 ENCOUNTER — TELEPHONE (OUTPATIENT)
Dept: CARDIOLOGY | Facility: CLINIC | Age: 88
End: 2023-08-08
Payer: MEDICARE

## 2023-08-08 NOTE — TELEPHONE ENCOUNTER
"Pt and son came to appt today. Appt was canceled on 5/1 when pt made appt for 5/2 for issues. Pt son told Bunny at the front to get him a medical professional when Bunny explained this to him. I went out front and spoke with pt and her son. He states he was never told this appt was canceled. I explained it was canceled on 5/1 and pt was seen on 5/2. Pt was supposed to make a 4-6 week appt after tests. Her son states, "I was never given test results." I told him I gave them to him. Pt son states, "it was given through static and I had no clue what you were saying and nobody called me back." I explained that it was documented he stated they would do medical treatment and he would talk  to Mrs. Childress and call me back if they decided anything different. I offered to get her results and explain them to pt and son. Son states, "it's been a few months. I've gotten them off my chart. I guess it's not important." Pt stated, "we drove a long way today." I explained that I did not mind asking Norma to see pt today and go over results and their concerns. Pt states,"I don't want to sit here all day." I, again, apologized and pt confirmed appt for October and they went home.   "

## 2023-09-22 RX ORDER — GUAIFENESIN AND DEXTROMETHORPHAN HYDROBROMIDE 60; 1200 MG/1; MG/1
TABLET, EXTENDED RELEASE ORAL
COMMUNITY
Start: 2023-05-18 | End: 2023-10-19

## 2023-09-22 RX ORDER — LEVOCETIRIZINE DIHYDROCHLORIDE 5 MG/1
TABLET, FILM COATED ORAL
COMMUNITY
Start: 2023-05-18 | End: 2023-10-19

## 2023-10-02 RX ORDER — DIGOXIN 125 MCG
TABLET ORAL
Qty: 45 TABLET | Refills: 3 | Status: SHIPPED | OUTPATIENT
Start: 2023-10-02

## 2023-10-05 ENCOUNTER — OFFICE VISIT (OUTPATIENT)
Dept: CARDIOLOGY | Facility: CLINIC | Age: 88
End: 2023-10-05
Payer: MEDICARE

## 2023-10-05 VITALS
SYSTOLIC BLOOD PRESSURE: 120 MMHG | DIASTOLIC BLOOD PRESSURE: 62 MMHG | WEIGHT: 109 LBS | HEART RATE: 60 BPM | HEIGHT: 61 IN | BODY MASS INDEX: 20.58 KG/M2 | OXYGEN SATURATION: 97 %

## 2023-10-05 DIAGNOSIS — I44.1 MOBITZ TYPE II ATRIOVENTRICULAR BLOCK: ICD-10-CM

## 2023-10-05 DIAGNOSIS — Z95.0 CARDIAC PACEMAKER IN SITU: ICD-10-CM

## 2023-10-05 DIAGNOSIS — I10 HYPERTENSION, UNSPECIFIED TYPE: Primary | ICD-10-CM

## 2023-10-05 PROCEDURE — 99214 PR OFFICE/OUTPT VISIT, EST, LEVL IV, 30-39 MIN: ICD-10-PCS | Mod: ,,, | Performed by: INTERNAL MEDICINE

## 2023-10-05 PROCEDURE — 99214 OFFICE O/P EST MOD 30 MIN: CPT | Mod: ,,, | Performed by: INTERNAL MEDICINE

## 2023-10-05 RX ORDER — LANOLIN ALCOHOL/MO/W.PET/CERES
400 CREAM (GRAM) TOPICAL DAILY
COMMUNITY

## 2023-10-13 NOTE — PROGRESS NOTES
PCP: Frieda, Primary Doctor    Referring Provider:     Subjective:   Fior Hernández is a 89 y.o. female with hx of HTN, hypothyroidism, Mobitz type II AVB s/p ppm, who presents for follow up for episodes of palpitations, notes symptoms have resolved.  She is taking naldolol daily, notes blood pressure is running low several days a week. She able to perform normal activities of daily living without provocation of chest pain, pressure or shortness of breath. She is monitoring her blood pressure at home, notes is usually controlled, however bp is elevated in the morning several days a week.          Fhx:  Family History   Problem Relation Age of Onset    No Known Problems Mother     No Known Problems Father      Shx:   Social History     Socioeconomic History    Marital status:    Tobacco Use    Smoking status: Never    Smokeless tobacco: Never   Substance and Sexual Activity    Alcohol use: Never    Drug use: Never       EKG 5/2/2023 AV dual paced rhythm; HR 61 bpm;   5/1/2023 a sensed v paced rhythm; HR 80 bpm  2/2/2023 AV dual paced rhythm; HR 68 bpm    Lexiscan 10/1/2012  Small apical perfusion defect with subtle improvement in rest images suspicious for ischemia  Small to moderate sized but subtle latero-basilar wall perfusion defect, possible ischemic territory. However can not r/o the possibility of artifact  Normal LV size and systolic function, EF 84%  This was discussed with the patient and her son Sp. Medical management was determined to be the option they they chose.         ECHO 10/1/2012  Normal chamber size, wall motion and systolic function, EF 55%  The aortic valve is calcified and sclerotic but maintains normal opening with trace AI.   Mitral valve and tricuspid valve are also sclerotic.  Pulmonic valve is not well visualized  Mild MR and moderate TR with RVSP 43 mmHg  Incidentally noticed is the pacemaker lead artifact in the right sided cardiac chambers  Positive bubbles noticed across into the  "left-sided chambers in small amount. Findings could be from intracardiac shunt    [facility] 3/23/2004  Normal right dominant coronary arteries free of significant CAD  No significant gradient was seen across the aortic valve- no significant aortic valvular stenosis  Normal LVSF  No significant MR        Lab Results   Component Value Date     09/08/2021    K 4.2 09/08/2021     09/08/2021    CO2 28 09/08/2021    BUN 16 09/08/2021    CREATININE 0.87 09/08/2021    CALCIUM 9.2 09/08/2021    ANIONGAP 11 09/08/2021    ESTGFRAFRICA 53 04/22/2021    EGFRNONAA 64 09/08/2021       No results found for: "CHOL"  No results found for: "HDL"  No results found for: "LDLCALC"  No results found for: "TRIG"  No results found for: "CHOLHDL"    Lab Results   Component Value Date    WBC 8.47 04/22/2021    HGB 12.2 04/22/2021    HCT 35.1 (L) 04/22/2021    MCV 95 04/22/2021     04/22/2021           Current Outpatient Medications:     aspirin (ECOTRIN) 81 MG EC tablet, Take 81 mg by mouth 2 (two) times a day., Disp: , Rfl:     calcium carbonate (OS-FARIDA) 500 mg calcium (1,250 mg) chewable tablet, Take 1 tablet by mouth once daily., Disp: , Rfl:     digoxin (LANOXIN) 125 mcg tablet, TAKE ONE-HALF (1/2) TABLET DAILY, Disp: 45 tablet, Rfl: 3    diltiaZEM (CARDIZEM CD) 240 MG 24 hr capsule, Take 240 mg by mouth once daily., Disp: , Rfl:     ezetimibe (ZETIA) 10 mg tablet, Take 10 mg by mouth once daily., Disp: , Rfl:     lactulose (CHRONULAC) 10 gram/15 mL solution, , Disp: , Rfl:     levothyroxine (SYNTHROID) 100 MCG tablet, Take 100 mcg by mouth before breakfast., Disp: , Rfl:     linaCLOtide (LINZESS) 145 mcg Cap capsule, Take 145 mcg by mouth before breakfast., Disp: , Rfl:     magnesium oxide (MAG-OX) 400 mg (241.3 mg magnesium) tablet, Take 400 mg by mouth once daily., Disp: , Rfl:     multivitamin capsule, Take 1 capsule by mouth once daily., Disp: , Rfl:     nadoloL (CORGARD) 80 MG tablet, Take 80 mg by mouth once daily., " "Disp: , Rfl:     omega-3 fatty acids/fish oil (FISH OIL-OMEGA-3 FATTY ACIDS) 300-1,000 mg capsule, Take 1 capsule by mouth once daily., Disp: , Rfl:     vit A/vit C/vit E/zinc/copper (ICAPS AREDS ORAL), Take 1 capsule by mouth once daily., Disp: , Rfl:     vit C/E/Zn/coppr/lutein/zeaxan (PRESERVISION AREDS-2 ORAL), Take by mouth once daily., Disp: , Rfl:     acetaminophen (TYLENOL) 325 MG tablet, Take 2 tablets (650 mg total) by mouth every 4 (four) hours as needed. (Patient not taking: Reported on 10/5/2023), Disp: , Rfl: 0    levocetirizine (XYZAL) 5 MG tablet, TAKE 1 TABLET BY MOUTH EVERY EVENING FOR 14 DAYS, Disp: , Rfl:     levothyroxine (SYNTHROID) 150 MCG tablet, Take 150 mcg by mouth before breakfast., Disp: , Rfl:     LINZESS 72 mcg Cap capsule, Take 72 mcg by mouth Daily., Disp: , Rfl:     MUCINEX DM 60-1,200 mg per 12 hr tablet, TAKE 1 TABLET BY MOUTH 2 TIMES A DAY FOR 14 DAYS, Disp: , Rfl:   Meds reviewed and reconciled using the list provided by the patient.     Review of Systems   Respiratory:  Positive for shortness of breath.    Cardiovascular:  Positive for palpitations and leg swelling.           Objective:   /62 (BP Location: Left arm, Patient Position: Sitting)   Pulse 60   Ht 5' 1" (1.549 m)   Wt 49.4 kg (109 lb)   SpO2 97%   BMI 20.60 kg/m²     Physical Exam  Vitals and nursing note reviewed.   Constitutional:       Appearance: Normal appearance. She is normal weight.   HENT:      Head: Normocephalic and atraumatic.   Neck:      Vascular: No carotid bruit.   Cardiovascular:      Rate and Rhythm: Normal rate and regular rhythm.      Pulses: Normal pulses.      Heart sounds: Normal heart sounds.      Comments: Well healed ppm site to left upper chest wall  Pulmonary:      Effort: Pulmonary effort is normal.      Breath sounds: Normal breath sounds.   Abdominal:      Palpations: Abdomen is soft.   Musculoskeletal:      Cervical back: Neck supple.      Right lower leg: Edema present.     "  Left lower leg: Edema present.      Comments: Trace edema BLE   Skin:     General: Skin is warm and dry.      Capillary Refill: Capillary refill takes less than 2 seconds.   Neurological:      General: No focal deficit present.      Mental Status: She is alert.           Assessment:           Plan:       Sick sinus syndrome, bradycardia controlled with PPMK, functioning normally.  Her son reports her blood pressure is low several days a week, she is symptomatic when her blood pressure is low. Will change naldolol to PRN, monitor daily, take metoprolol if heart rate is greater than 110 or if systolic blood pressure is over 140 mm HG.   Hypertension, not well controlled

## 2023-10-19 ENCOUNTER — OFFICE VISIT (OUTPATIENT)
Dept: CARDIOLOGY | Facility: CLINIC | Age: 88
End: 2023-10-19
Payer: MEDICARE

## 2023-10-19 ENCOUNTER — HOSPITAL ENCOUNTER (OUTPATIENT)
Dept: CARDIOLOGY | Facility: HOSPITAL | Age: 88
Discharge: HOME | End: 2023-10-19
Attending: INTERNAL MEDICINE
Payer: MEDICARE

## 2023-10-19 VITALS
BODY MASS INDEX: 20.58 KG/M2 | DIASTOLIC BLOOD PRESSURE: 70 MMHG | SYSTOLIC BLOOD PRESSURE: 130 MMHG | HEART RATE: 64 BPM | WEIGHT: 109 LBS | HEIGHT: 61 IN | OXYGEN SATURATION: 97 %

## 2023-10-19 DIAGNOSIS — R00.2 PALPITATIONS: ICD-10-CM

## 2023-10-19 DIAGNOSIS — Z95.0 CARDIAC PACEMAKER IN SITU: Primary | ICD-10-CM

## 2023-10-19 DIAGNOSIS — Z95.0 PRESENCE OF CARDIAC PACEMAKER: Primary | ICD-10-CM

## 2023-10-19 DIAGNOSIS — Z95.0 PRESENCE OF CARDIAC PACEMAKER: ICD-10-CM

## 2023-10-19 DIAGNOSIS — I10 PRIMARY HYPERTENSION: ICD-10-CM

## 2023-10-19 LAB
BATTERY VOLTAGE (V): 3.01 V
ERI (V): 2.63 V
OHS CV DC PP MS1: 0.4 MS
OHS CV DC PP MS2: 0.4 MS
OHS CV DC PP V1: 1.5 V
OHS CV DC PP V2: 2 V

## 2023-10-19 PROCEDURE — 99214 OFFICE O/P EST MOD 30 MIN: CPT | Mod: PBBFAC | Performed by: INTERNAL MEDICINE

## 2023-10-19 PROCEDURE — 93280 CARDIAC DEVICE CHECK - IN CLINIC & HOSPITAL: ICD-10-PCS | Mod: 26,,, | Performed by: INTERNAL MEDICINE

## 2023-10-19 PROCEDURE — 93280 PM DEVICE PROGR EVAL DUAL: CPT | Mod: 26,,, | Performed by: INTERNAL MEDICINE

## 2023-10-19 PROCEDURE — 93280 PM DEVICE PROGR EVAL DUAL: CPT

## 2023-10-19 PROCEDURE — 99213 PR OFFICE/OUTPT VISIT, EST, LEVL III, 20-29 MIN: ICD-10-PCS | Mod: S$PBB,,, | Performed by: INTERNAL MEDICINE

## 2023-10-19 PROCEDURE — 99213 OFFICE O/P EST LOW 20 MIN: CPT | Mod: S$PBB,,, | Performed by: INTERNAL MEDICINE

## 2023-10-20 NOTE — PROGRESS NOTES
PCP: Frieda, Primary Doctor    Referring Provider:     Subjective:   Fior Hernández is a 89 y.o. female with hx of HTN, hypothyroidism, Mobitz type II AVB s/p ppm, who presents for follow up for episodes of palpitations, notes symptoms have resolved.  She has stopped taking Naldolol, notes  low blood pressure several days a week has resolved. She able to perform normal activities of daily living without provocation of chest pain, pressure or shortness of breath.       Fhx:  Family History   Problem Relation Age of Onset    No Known Problems Mother     No Known Problems Father      Shx:   Social History     Socioeconomic History    Marital status:    Tobacco Use    Smoking status: Never    Smokeless tobacco: Never   Substance and Sexual Activity    Alcohol use: Never    Drug use: Never       EKG 5/2/2023 AV dual paced rhythm; HR 61 bpm;   5/1/2023 a sensed v paced rhythm; HR 80 bpm  2/2/2023 AV dual paced rhythm; HR 68 bpm    Lexiscan 10/1/2012  Small apical perfusion defect with subtle improvement in rest images suspicious for ischemia  Small to moderate sized but subtle latero-basilar wall perfusion defect, possible ischemic territory. However can not r/o the possibility of artifact  Normal LV size and systolic function, EF 84%  This was discussed with the patient and her son Sp. Medical management was determined to be the option they they chose.         ECHO 10/1/2012  Normal chamber size, wall motion and systolic function, EF 55%  The aortic valve is calcified and sclerotic but maintains normal opening with trace AI.   Mitral valve and tricuspid valve are also sclerotic.  Pulmonic valve is not well visualized  Mild MR and moderate TR with RVSP 43 mmHg  Incidentally noticed is the pacemaker lead artifact in the right sided cardiac chambers  Positive bubbles noticed across into the left-sided chambers in small amount. Findings could be from intracardiac shunt    [facility] 3/23/2004  Normal right dominant coronary  "arteries free of significant CAD  No significant gradient was seen across the aortic valve- no significant aortic valvular stenosis  Normal LVSF  No significant MR        Lab Results   Component Value Date     09/08/2021    K 4.2 09/08/2021     09/08/2021    CO2 28 09/08/2021    BUN 16 09/08/2021    CREATININE 0.87 09/08/2021    CALCIUM 9.2 09/08/2021    ANIONGAP 11 09/08/2021    ESTGFRAFRICA 53 04/22/2021    EGFRNONAA 64 09/08/2021       No results found for: "CHOL"  No results found for: "HDL"  No results found for: "LDLCALC"  No results found for: "TRIG"  No results found for: "CHOLHDL"    Lab Results   Component Value Date    WBC 8.47 04/22/2021    HGB 12.2 04/22/2021    HCT 35.1 (L) 04/22/2021    MCV 95 04/22/2021     04/22/2021           Current Outpatient Medications:     aspirin (ECOTRIN) 81 MG EC tablet, Take 81 mg by mouth 2 (two) times a day., Disp: , Rfl:     calcium carbonate (OS-FARIDA) 500 mg calcium (1,250 mg) chewable tablet, Take 1 tablet by mouth once daily., Disp: , Rfl:     digoxin (LANOXIN) 125 mcg tablet, TAKE ONE-HALF (1/2) TABLET DAILY, Disp: 45 tablet, Rfl: 3    diltiaZEM (CARDIZEM CD) 240 MG 24 hr capsule, Take 240 mg by mouth once daily., Disp: , Rfl:     ezetimibe (ZETIA) 10 mg tablet, Take 10 mg by mouth once daily., Disp: , Rfl:     lactulose (CHRONULAC) 10 gram/15 mL solution, , Disp: , Rfl:     levothyroxine (SYNTHROID) 100 MCG tablet, Take 100 mcg by mouth before breakfast., Disp: , Rfl:     LINZESS 72 mcg Cap capsule, Take 72 mcg by mouth Daily., Disp: , Rfl:     magnesium oxide (MAG-OX) 400 mg (241.3 mg magnesium) tablet, Take 400 mg by mouth once daily., Disp: , Rfl:     multivitamin capsule, Take 1 capsule by mouth once daily., Disp: , Rfl:     nadoloL (CORGARD) 80 MG tablet, Take 80 mg by mouth once daily., Disp: , Rfl:     omega-3 fatty acids/fish oil (FISH OIL-OMEGA-3 FATTY ACIDS) 300-1,000 mg capsule, Take 1 capsule by mouth once daily., Disp: , Rfl:     vit " "A/vit C/vit E/zinc/copper (ICAPS AREDS ORAL), Take 1 capsule by mouth once daily., Disp: , Rfl:   Meds reviewed and reconciled using the list provided by the patient.     Review of Systems   Respiratory:  Positive for shortness of breath.    Cardiovascular:  Positive for palpitations and leg swelling.           Objective:   /70 (BP Location: Left arm, Patient Position: Sitting)   Pulse 64   Ht 5' 1" (1.549 m)   Wt 49.4 kg (109 lb)   SpO2 97%   BMI 20.60 kg/m²     Physical Exam  Vitals and nursing note reviewed.   Constitutional:       Appearance: Normal appearance. She is normal weight.   HENT:      Head: Normocephalic and atraumatic.   Neck:      Vascular: No carotid bruit.   Cardiovascular:      Rate and Rhythm: Normal rate and regular rhythm.      Pulses: Normal pulses.      Heart sounds: Normal heart sounds.      Comments: Well healed ppm site to left upper chest wall  Pulmonary:      Effort: Pulmonary effort is normal.      Breath sounds: Normal breath sounds.   Abdominal:      Palpations: Abdomen is soft.   Musculoskeletal:      Cervical back: Neck supple.      Right lower leg: Edema present.      Left lower leg: Edema present.      Comments: Trace edema BLE   Skin:     General: Skin is warm and dry.      Capillary Refill: Capillary refill takes less than 2 seconds.   Neurological:      General: No focal deficit present.      Mental Status: She is alert.           Assessment:           Plan:       Sick sinus syndrome, bradycardia controlled with PPMK, pacer interrogated today, functioning normally.   Hypertension: blood pressure is low several days a week, she is symptomatic when her blood pressure is low. Changed to  naldolol to PRN, monitor daily, has not taken in two weeks, feels much better.   "

## 2024-04-23 ENCOUNTER — HOSPITAL ENCOUNTER (OUTPATIENT)
Dept: CARDIOLOGY | Facility: HOSPITAL | Age: 89
Discharge: HOME | End: 2024-04-23
Attending: INTERNAL MEDICINE
Payer: MEDICARE

## 2024-04-23 ENCOUNTER — OFFICE VISIT (OUTPATIENT)
Dept: CARDIOLOGY | Facility: CLINIC | Age: 89
End: 2024-04-23
Payer: MEDICARE

## 2024-04-23 VITALS
HEART RATE: 68 BPM | OXYGEN SATURATION: 96 % | DIASTOLIC BLOOD PRESSURE: 72 MMHG | WEIGHT: 106 LBS | SYSTOLIC BLOOD PRESSURE: 146 MMHG | HEIGHT: 60 IN | BODY MASS INDEX: 20.81 KG/M2

## 2024-04-23 DIAGNOSIS — Z95.0 PRESENCE OF CARDIAC PACEMAKER: ICD-10-CM

## 2024-04-23 DIAGNOSIS — R00.2 PALPITATIONS: ICD-10-CM

## 2024-04-23 DIAGNOSIS — Z95.0 PRESENCE OF CARDIAC PACEMAKER: Primary | ICD-10-CM

## 2024-04-23 DIAGNOSIS — I44.1 MOBITZ TYPE II ATRIOVENTRICULAR BLOCK: Primary | ICD-10-CM

## 2024-04-23 DIAGNOSIS — Z95.0 CARDIAC PACEMAKER IN SITU: ICD-10-CM

## 2024-04-23 DIAGNOSIS — I10 PRIMARY HYPERTENSION: ICD-10-CM

## 2024-04-23 PROCEDURE — 99214 OFFICE O/P EST MOD 30 MIN: CPT | Mod: PBBFAC,25 | Performed by: INTERNAL MEDICINE

## 2024-04-23 PROCEDURE — 99214 OFFICE O/P EST MOD 30 MIN: CPT | Mod: S$PBB,,, | Performed by: INTERNAL MEDICINE

## 2024-04-23 PROCEDURE — 93010 ELECTROCARDIOGRAM REPORT: CPT | Mod: S$PBB,XE,ICN, | Performed by: INTERNAL MEDICINE

## 2024-04-23 PROCEDURE — 93280 PM DEVICE PROGR EVAL DUAL: CPT | Mod: 26,,, | Performed by: INTERNAL MEDICINE

## 2024-04-23 PROCEDURE — 93005 ELECTROCARDIOGRAM TRACING: CPT | Mod: PBBFAC | Performed by: INTERNAL MEDICINE

## 2024-04-23 PROCEDURE — 93280 PM DEVICE PROGR EVAL DUAL: CPT

## 2024-04-23 RX ORDER — LEVOTHYROXINE SODIUM 88 UG/1
88 TABLET ORAL
COMMUNITY

## 2024-04-23 NOTE — PROGRESS NOTES
PCP: Frieda, Primary Doctor    Referring Provider:     Subjective:   Fior Hernández is a 89 y.o. female with hx of HTN, hypothyroidism, Mobitz type II AVB s/p ppm, who presents for follow up for episodes of palpitations, notes symptoms have resolved.  She is active, performing normal activities of daily living without symptoms.  She has continued on Naldolol, notes  low blood pressure several days a week has resolved. She able to perform normal activities of daily living without provocation of chest pain, pressure or shortness of breath.       Fhx:  Family History   Problem Relation Name Age of Onset    No Known Problems Mother      No Known Problems Father       Shx:   Social History     Socioeconomic History    Marital status:    Tobacco Use    Smoking status: Never    Smokeless tobacco: Never   Substance and Sexual Activity    Alcohol use: Never    Drug use: Never       EKG 5/2/2023 AV dual paced rhythm; HR 61 bpm;   5/1/2023 a sensed v paced rhythm; HR 80 bpm  2/2/2023 AV dual paced rhythm; HR 68 bpm    Lexiscan 10/1/2012  Small apical perfusion defect with subtle improvement in rest images suspicious for ischemia  Small to moderate sized but subtle latero-basilar wall perfusion defect, possible ischemic territory. However can not r/o the possibility of artifact  Normal LV size and systolic function, EF 84%  This was discussed with the patient and her son Sp. Medical management was determined to be the option they they chose.         ECHO 10/1/2012  Normal chamber size, wall motion and systolic function, EF 55%  The aortic valve is calcified and sclerotic but maintains normal opening with trace AI.   Mitral valve and tricuspid valve are also sclerotic.  Pulmonic valve is not well visualized  Mild MR and moderate TR with RVSP 43 mmHg  Incidentally noticed is the pacemaker lead artifact in the right sided cardiac chambers  Positive bubbles noticed across into the left-sided chambers in small amount. Findings  "could be from intracardiac shunt    [facility] 3/23/2004  Normal right dominant coronary arteries free of significant CAD  No significant gradient was seen across the aortic valve- no significant aortic valvular stenosis  Normal LVSF  No significant MR        Lab Results   Component Value Date     09/08/2021    K 4.2 09/08/2021     09/08/2021    CO2 28 09/08/2021    BUN 16 09/08/2021    CREATININE 0.87 09/08/2021    CALCIUM 9.2 09/08/2021    ANIONGAP 11 09/08/2021    ESTGFRAFRICA 53 04/22/2021    EGFRNONAA 64 09/08/2021       No results found for: "CHOL"  No results found for: "HDL"  No results found for: "LDLCALC"  No results found for: "TRIG"  No results found for: "CHOLHDL"    Lab Results   Component Value Date    WBC 8.47 04/22/2021    HGB 12.2 04/22/2021    HCT 35.1 (L) 04/22/2021    MCV 95 04/22/2021     04/22/2021           Current Outpatient Medications:     aspirin (ECOTRIN) 81 MG EC tablet, Take 81 mg by mouth 2 (two) times a day., Disp: , Rfl:     calcium carbonate (OS-FARIDA) 500 mg calcium (1,250 mg) chewable tablet, Take 1 tablet by mouth once daily., Disp: , Rfl:     digoxin (LANOXIN) 125 mcg tablet, TAKE ONE-HALF (1/2) TABLET DAILY, Disp: 45 tablet, Rfl: 3    diltiaZEM (CARDIZEM CD) 240 MG 24 hr capsule, Take 240 mg by mouth once daily., Disp: , Rfl:     levothyroxine (SYNTHROID) 88 MCG tablet, Take 88 mcg by mouth before breakfast., Disp: , Rfl:     LINZESS 72 mcg Cap capsule, Take 72 mcg by mouth Daily., Disp: , Rfl:     magnesium oxide (MAG-OX) 400 mg (241.3 mg magnesium) tablet, Take 400 mg by mouth once daily., Disp: , Rfl:     nadoloL (CORGARD) 80 MG tablet, Take 80 mg by mouth once daily., Disp: , Rfl:     omega-3 fatty acids/fish oil (FISH OIL-OMEGA-3 FATTY ACIDS) 300-1,000 mg capsule, Take 1 capsule by mouth once daily., Disp: , Rfl:     ezetimibe (ZETIA) 10 mg tablet, Take 10 mg by mouth once daily. (Patient not taking: Reported on 4/23/2024), Disp: , Rfl:     lactulose (CHRONULAC) " 10 gram/15 mL solution, , Disp: , Rfl:     levothyroxine (SYNTHROID) 100 MCG tablet, Take 100 mcg by mouth before breakfast. (Patient not taking: Reported on 4/23/2024), Disp: , Rfl:     multivitamin capsule, Take 1 capsule by mouth once daily., Disp: , Rfl:     vit A/vit C/vit E/zinc/copper (ICAPS AREDS ORAL), Take 1 capsule by mouth once daily., Disp: , Rfl:   Meds reviewed and reconciled using the list provided by the patient.     Review of Systems   Respiratory:  Positive for shortness of breath.    Cardiovascular:  Positive for palpitations and leg swelling.           Objective:   BP (!) 146/72 (BP Location: Left arm, Patient Position: Sitting)   Pulse 68   Ht 5' (1.524 m)   Wt 48.1 kg (106 lb)   SpO2 96%   BMI 20.70 kg/m²     Physical Exam  Vitals and nursing note reviewed.   Constitutional:       Appearance: Normal appearance. She is normal weight.   HENT:      Head: Normocephalic and atraumatic.   Neck:      Vascular: No carotid bruit.   Cardiovascular:      Rate and Rhythm: Normal rate and regular rhythm.      Pulses: Normal pulses.      Heart sounds: Normal heart sounds.      Comments: Well healed ppm site to left upper chest wall  Pulmonary:      Effort: Pulmonary effort is normal.      Breath sounds: Normal breath sounds.   Abdominal:      Palpations: Abdomen is soft.   Musculoskeletal:      Cervical back: Neck supple.      Right lower leg: Edema present.      Left lower leg: Edema present.      Comments: Trace edema BLE   Skin:     General: Skin is warm and dry.      Capillary Refill: Capillary refill takes less than 2 seconds.   Neurological:      General: No focal deficit present.      Mental Status: She is alert.           Assessment:           Plan:       Sick sinus syndrome, bradycardia controlled with PPMK, pacer interrogated today, functioning normally.   Hypertension: low blood pressure symptoms have resolved.    3. Hypothyroid: replacement dose just decreased, remains asymptomatic.   4.   Hyperlipidemia, tolerating Zetia well.       Follow up in six months, sooner if symptoms change.

## 2024-04-29 LAB
OHS QRS DURATION: 162 MS
OHS QTC CALCULATION: 460 MS

## (undated) DEVICE — GLOVE SURG BIOGEL SZ 7.5

## (undated) DEVICE — SPONGE GAUZE 4X4 12 PLY STL AMD 10/TRAY

## (undated) DEVICE — PAD GROUND ELECTROD DISP VALLEY

## (undated) DEVICE — PENCIL HANDSWITCHING ROCKER SW

## (undated) DEVICE — BOWL PLASTIC 32OZ STERILE BLUE

## (undated) DEVICE — TOWEL OR STERILE BLUE 4/PK 20PK/CS

## (undated) DEVICE — POSITIONER ULNAR NERVE

## (undated) DEVICE — PREOP ADVANCED NOZIN NASAL SANITIZER

## (undated) DEVICE — COVER SNAP KAPS 26INCH DEPTH

## (undated) DEVICE — STAPLER SKIN PROXIMATE PLUS MD 35 REG DISP

## (undated) DEVICE — SET IV PRIMARY ALARIS (PRIMARY)

## (undated) DEVICE — NEEDLE SPINAL 22G X 2 1/2IN

## (undated) DEVICE — SCALPEL SAFETY 15 BLADE

## (undated) DEVICE — LINER CANISTER 1500CC INSERT MEDIVAC

## (undated) DEVICE — GOWN SURGICAL STERILE LEVEL 3 / XX-LARGE

## (undated) DEVICE — TUBING CAPNOLINE PLUS SMART 02 CONNECTOR(ORDER 65110)

## (undated) DEVICE — APPLICATOR CHLORAPREP HI-LITE TINTED ORANGE 26ML

## (undated) DEVICE — COUNTER SHARPS FOAM BLOCK MAGNET 40-70

## (undated) DEVICE — GLOVE SURGICAL PROTEXIS PI SIZE 6

## (undated) DEVICE — BAG-A-JET FLUID DISPENSER SYSTEM

## (undated) DEVICE — HANDLE YANKAUER SUCTION K80 LATEX FREE

## (undated) DEVICE — DRESSING ISLAND 4 X6 IN SILVERLON

## (undated) DEVICE — PACK TIBURON UNIVERSAL

## (undated) DEVICE — SUTURE VICRYL 2-0 CT-2 VIO 27"

## (undated) DEVICE — TUBING SUCTION 5MM STERILE 3/16IN X 12FT

## (undated) DEVICE — SYRINGE ASEPTO IRRIGATION BULB 60CC LF DISP

## (undated) DEVICE — ELECTRODE ADULT DEFIBRILLATION QUIK COMBO ECG PK

## (undated) DEVICE — FILM IOBAN ANTIMICROBL 35X35CM

## (undated) DEVICE — DRAPE SURGICAL PACEMAKER 80 X 115""

## (undated) DEVICE — WIPE 2% CHLORHEXIDINE GLUCONATE CLOTH

## (undated) DEVICE — SPONGE XRAY DETECT 8X4IN GAUZE STERILE

## (undated) DEVICE — SET IV EXTENSION 42IN W/2 PORT CLEARLINK

## (undated) DEVICE — COVER LITE HANDLE FLEX PK/1

## (undated) DEVICE — SENSOR PULSE OX ADULT